# Patient Record
Sex: MALE | Race: WHITE | NOT HISPANIC OR LATINO | Employment: FULL TIME | URBAN - METROPOLITAN AREA
[De-identification: names, ages, dates, MRNs, and addresses within clinical notes are randomized per-mention and may not be internally consistent; named-entity substitution may affect disease eponyms.]

---

## 2017-12-13 ENCOUNTER — OFFICE VISIT (OUTPATIENT)
Dept: FAMILY MEDICINE | Facility: CLINIC | Age: 32
End: 2017-12-13
Payer: COMMERCIAL

## 2017-12-13 VITALS
DIASTOLIC BLOOD PRESSURE: 85 MMHG | HEIGHT: 70 IN | TEMPERATURE: 98.3 F | SYSTOLIC BLOOD PRESSURE: 132 MMHG | WEIGHT: 180.5 LBS | OXYGEN SATURATION: 97 % | BODY MASS INDEX: 25.84 KG/M2 | HEART RATE: 89 BPM

## 2017-12-13 DIAGNOSIS — Z00.00 ENCOUNTER FOR ROUTINE ADULT HEALTH EXAMINATION WITHOUT ABNORMAL FINDINGS: Primary | ICD-10-CM

## 2017-12-13 DIAGNOSIS — F41.9 ANXIETY: ICD-10-CM

## 2017-12-13 PROCEDURE — 99213 OFFICE O/P EST LOW 20 MIN: CPT | Mod: 25 | Performed by: FAMILY MEDICINE

## 2017-12-13 PROCEDURE — 99385 PREV VISIT NEW AGE 18-39: CPT | Performed by: FAMILY MEDICINE

## 2017-12-13 RX ORDER — SERTRALINE HYDROCHLORIDE 100 MG/1
100 TABLET, FILM COATED ORAL DAILY
Qty: 30 TABLET | Refills: 1 | Status: SHIPPED | OUTPATIENT
Start: 2017-12-13 | End: 2018-03-07

## 2017-12-13 ASSESSMENT — ANXIETY QUESTIONNAIRES
5. BEING SO RESTLESS THAT IT IS HARD TO SIT STILL: MORE THAN HALF THE DAYS
2. NOT BEING ABLE TO STOP OR CONTROL WORRYING: MORE THAN HALF THE DAYS
7. FEELING AFRAID AS IF SOMETHING AWFUL MIGHT HAPPEN: MORE THAN HALF THE DAYS
6. BECOMING EASILY ANNOYED OR IRRITABLE: NEARLY EVERY DAY
3. WORRYING TOO MUCH ABOUT DIFFERENT THINGS: NEARLY EVERY DAY
1. FEELING NERVOUS, ANXIOUS, OR ON EDGE: NEARLY EVERY DAY
GAD7 TOTAL SCORE: 17

## 2017-12-13 ASSESSMENT — PATIENT HEALTH QUESTIONNAIRE - PHQ9
5. POOR APPETITE OR OVEREATING: MORE THAN HALF THE DAYS
SUM OF ALL RESPONSES TO PHQ QUESTIONS 1-9: 5

## 2017-12-13 NOTE — MR AVS SNAPSHOT
After Visit Summary   12/13/2017    Angel Luis Reynolds    MRN: [id number]           Patient Information     Date Of Birth          1985        Visit Information        Provider Department      12/13/2017 12:00 PM Miri Wise MD [facility]        Today's Diagnoses     Encounter for routine adult health examination without abnormal findings    -  1    Anxiety          Care Instructions      Preventive Health Recommendations  Male Ages 26 - 39    Yearly exam:             See your health care provider every year in order to  o   Review health changes.   o   Discuss preventive care.    o   Review your medicines if your doctor has prescribed any.    You should be tested each year for STDs (sexually transmitted diseases), if you re at risk.     After age 35, talk to your provider about cholesterol testing. If you are at risk for heart disease, have your cholesterol tested at least every 5 years.     If you are at risk for diabetes, you should have a diabetes test (fasting glucose).  Shots: Get a flu shot each year. Get a tetanus shot every 10 years.     Nutrition:    Eat at least 5 servings of fruits and vegetables daily.     Eat whole-grain bread, whole-wheat pasta and brown rice instead of white grains and rice.     Talk to your provider about Calcium and Vitamin D.     Lifestyle    Exercise for at least 150 minutes a week (30 minutes a day, 5 days a week). This will help you control your weight and prevent disease.     Limit alcohol to one drink per day.     No smoking.     Wear sunscreen to prevent skin cancer.     See your dentist every six months for an exam and cleaning.             Follow-ups after your visit        Who to contact     If you have questions or need follow up information about today's clinic visit or your schedule please contact [facility] directly at [phone].  Normal or non-critical lab and imaging results will be communicated to you by MyChart, letter  "or phone within 4 business days after the clinic has received the results. If you do not hear from us within 7 days, please contact the clinic through Xinrong or phone. If you have a critical or abnormal lab result, we will notify you by phone as soon as possible.  Submit refill requests through Xinrong or call your pharmacy and they will forward the refill request to us. Please allow 3 business days for your refill to be completed.          Additional Information About Your Visit        Xinrong Information     Xinrong gives you secure access to your electronic health record. If you see a primary care provider, you can also send messages to your care team and make appointments. If you have questions, please call your primary care clinic.  If you do not have a primary care provider, please call [phone] and they will assist you.        Care EveryWhere ID     This is your Care EveryWhere ID. This could be used by other organizations to access your [facility] medical records  [id number]        Your Vitals Were     Pulse Temperature Height Pulse Oximetry BMI (Body Mass Index)       89 98.3  F (36.8  C) (Oral) 5' 10\" (1.778 m) 97% 25.9 kg/m2        Blood Pressure from Last 3 Encounters:   12/13/17 132/85    Weight from Last 3 Encounters:   12/13/17 180 lb 8 oz (81.9 kg)              We Performed the Following     OFFICE/OUTPT VISIT,EST,LEVL III          Today's Medication Changes          These changes are accurate as of: 12/13/17 11:59 PM.  If you have any questions, ask your nurse or doctor.               These medicines have changed or have updated prescriptions.        Dose/Directions    * ZOLOFT PO   This may have changed:  Another medication with the same name was added. Make sure you understand how and when to take each.   Changed by:  Miri Wise MD        Dose:  50 mg   Take 50 mg by mouth daily   Refills:  0       * sertraline 100 MG tablet   Commonly known as:  ZOLOFT   This may have changed:  You were " already taking a medication with the same name, and this prescription was added. Make sure you understand how and when to take each.   Used for:  Anxiety   Changed by:  Miri Wise MD        Dose:  100 mg   Take 1 tablet (100 mg) by mouth daily   Quantity:  30 tablet   Refills:  1       * Notice:  This list has 2 medication(s) that are the same as other medications prescribed for you. Read the directions carefully, and ask your doctor or other care provider to review them with you.         Where to get your medicines      These medications [facility] to [id number] [address] [facility] [place] [address] [place] [address]     Phone:  [phone]     sertraline 100 MG tablet                Primary Care Provider Office Phone # Fax #    Miri Wise [phone] [address] [address]        Equal Access to Services     AI[facility]NA [facility] Hospital[facility] AH: Hadii aad ku hadasho Soomaali, waaxda luqadaha, qaybta kaalmada adeegyada, waxay idiin hayaan shekhar chino . So [facility] [phone].    ATENCIÓN: Si habla español, tiene a winslow disposición servicios gratuitos de asistencia lingüística. [facility] [phone].    We comply with applicable federal civil rights laws and [place] laws. We do not discriminate on the basis of race, color, national origin, age, disability, sex, sexual orientation, or gender identity.            Thank you!     Thank you for choosing [facility]  for your care. Our goal is always to provide you with excellent care. Hearing back from our patients is one way we can continue to improve our services. Please take a few minutes to complete the written survey that you may receive in the mail after your visit with us. Thank you!             Your Updated Medication List - Protect others around you: Learn how to safely use, store and throw away your medicines at www.disposemymeds.org.          This  list is accurate as of: 12/13/17 11:59 PM.  Always use your most recent med list.                   Brand Name Dispense Instructions for use Diagnosis    * ZOLOFT PO      Take 50 mg by mouth daily        * sertraline 100 MG tablet    ZOLOFT    30 tablet    Take 1 tablet (100 mg) by mouth daily    Anxiety       * Notice:  This list has 2 medication(s) that are the same as other medications prescribed for you. Read the directions carefully, and ask your doctor or other care provider to review them with you.

## 2017-12-13 NOTE — PROGRESS NOTES
SUBJECTIVE:   CC: Angel Luis Reynolds is an 32 year old male who presents for preventative health visit.     Healthy Habits:    Do you get at least three servings of calcium containing foods daily (dairy, green leafy vegetables, etc.)? yes    Amount of exercise or daily activities, outside of work: 4-5 day(s) per week    Problems taking medications regularly No    Medication side effects: No    Have you had an eye exam in the past two years? no    Do you see a dentist twice per year? yes    Do you have sleep apnea, excessive snoring or daytime drowsiness?no     1) anxiety - he has been on the 50mg of Zoloft for a couple of years now , but feels that his anxiety is getting worse lately , not able to relax , restless , worries more , wondering what to do , he denies any depression, states that there is a strong FH of anxiety in his family.  He denies any side effects while being on the zoloft , does seem to have more stress lately           Today's PHQ-2 Score: No flowsheet data found.      Abuse: Current or Past(Physical, Sexual or Emotional)- No  Do you feel safe in your environment - Yes  Social History   Substance Use Topics     Smoking status: Not on file     Smokeless tobacco: Not on file     Alcohol use Not on file      If you drink alcohol do you typically have >3 drinks per day or >7 drinks per week? No                      Last PSA: No results found for: PSA    Reviewed orders with patient. Reviewed health maintenance and updated orders accordingly - Yes  Labs reviewed in EPIC  BP Readings from Last 3 Encounters:   12/13/17 132/85    Wt Readings from Last 3 Encounters:   12/13/17 180 lb 8 oz (81.9 kg)                  Patient Active Problem List   Diagnosis     Anxiety     History reviewed. No pertinent surgical history.    Social History   Substance Use Topics     Smoking status: Never Smoker     Smokeless tobacco: Never Used     Alcohol use Yes      Comment: Socially      Family History   Problem Relation Age  of Onset     Hypertension Father      Breast Cancer Maternal Grandmother      Coronary Artery Disease Paternal Grandfather      DIABETES Paternal Grandfather      Other - See Comments Paternal Grandfather      Stroke          Current Outpatient Prescriptions   Medication Sig Dispense Refill     Sertraline HCl (ZOLOFT PO) Take 50 mg by mouth daily       sertraline (ZOLOFT) 100 MG tablet Take 1 tablet (100 mg) by mouth daily 30 tablet 1     Not on File  No lab results found.           Reviewed and updated as needed this visit by clinical staffMeds         Reviewed and updated as needed this visit by Provider        History reviewed. No pertinent past medical history.   History reviewed. No pertinent surgical history.    ROS:  C: NEGATIVE for fever, chills, change in weight  I: NEGATIVE for worrisome rashes, moles or lesions  E: NEGATIVE for vision changes or irritation  ENT: NEGATIVE for ear, mouth and throat problems  R: NEGATIVE for significant cough or SOB  CV: NEGATIVE for chest pain, palpitations or peripheral edema  GI: NEGATIVE for nausea, abdominal pain, heartburn, or change in bowel habits   male: negative for dysuria, hematuria, decreased urinary stream, erectile dysfunction, urethral discharge  M: NEGATIVE for significant arthralgias or myalgia  N: NEGATIVE for weakness, dizziness or paresthesias  P: NEGATIVE for changes in mood or affect    OBJECTIVE:   There were no vitals taken for this visit.  EXAM:  GENERAL: healthy, alert and no distress  EYES: Eyes grossly normal to inspection, PERRL and conjunctivae and sclerae normal  HENT: ear canals and TM's normal, nose and mouth without ulcers or lesions  NECK: no adenopathy, no asymmetry, masses, or scars and thyroid normal to palpation  RESP: lungs clear to auscultation - no rales, rhonchi or wheezes  CV: regular rate and rhythm, normal S1 S2, no S3 or S4, no murmur, click or rub, no peripheral edema and peripheral pulses strong  ABDOMEN: soft, nontender,  no hepatosplenomegaly, no masses and bowel sounds normal  MS: no gross musculoskeletal defects noted, no edema  SKIN: no suspicious lesions or rashes  NEURO: Normal strength and tone, mentation intact and speech normal  PSYCH: mentation appears normal, affect normal/bright    ASSESSMENT/PLAN:   1. Encounter for routine adult health examination without abnormal findings  Doing well, will come back for fasting labs     2. Anxiety  Seems that his anxiety is getting worse lately and he has been on the 50mg of Zoloft , discussed increasing the dose to 100mg daily , also seeing a therapist , more walking /exercise , Vit D at 2000 UI daily . He will need a f/u in four to 5 weeks sooner if any side effects or worsening symptoms develop. Pt is aware  and comfortable with the current plan.\    - sertraline (ZOLOFT) 100 MG tablet; Take 1 tablet (100 mg) by mouth daily  Dispense: 30 tablet; Refill: 1    COUNSELING:  Reviewed preventive health counseling, as reflected in patient instructions       Regular exercise       Healthy diet/nutrition       Vision screening       Safe sex practices/STD prevention       has no tobacco history on file.      There is no height or weight on file to calculate BMI.         Counseling Resources:  ATP IV Guidelines  Pooled Cohorts Equation Calculator  FRAX Risk Assessment  ICSI Preventive Guidelines  Dietary Guidelines for Americans, 2010  USDA's MyPlate  ASA Prophylaxis  Lung CA Screening    Miri Wise MD  [facility] [place]

## 2017-12-14 ASSESSMENT — ANXIETY QUESTIONNAIRES: GAD7 TOTAL SCORE: 17

## 2018-03-05 DIAGNOSIS — F41.9 ANXIETY: ICD-10-CM

## 2018-03-05 NOTE — TELEPHONE ENCOUNTER
"Last OV 12/13/2017 - noted to f/u in 4-5 weeks (new pt to LS)  Due for f/u  [employer] message sent to pt.  Cynthia CHEN RN    Requested Prescriptions   Pending Prescriptions Disp Refills     sertraline (ZOLOFT) 100 MG tablet [Pharmacy Med Name: SERTRALINE 100MG TABLETS] 30 tablet 0     Sig: TAKE 1 TABLET(100 MG) BY MOUTH DAILY    SSRIs Protocol Passed    3/5/2018 12:11 PM       Passed - Recent (12 mo) or future (30 days) visit within the authorizing provider's specialty    Patient had office visit in the last year or has a visit in the next 30 days with authorizing provider.  See \"Patient Info\" tab in inbasket, or \"Choose Columns\" in Meds & Orders section of the refill encounter.            Passed - Patient is age 18 or older              "

## 2018-03-07 ENCOUNTER — TELEPHONE (OUTPATIENT)
Dept: FAMILY MEDICINE | Facility: CLINIC | Age: 33
End: 2018-03-07

## 2018-03-07 DIAGNOSIS — F41.9 ANXIETY: ICD-10-CM

## 2018-03-07 RX ORDER — SERTRALINE HYDROCHLORIDE 100 MG/1
TABLET, FILM COATED ORAL
Refills: 0
Start: 2018-03-07

## 2018-03-07 RX ORDER — SERTRALINE HYDROCHLORIDE 100 MG/1
100 TABLET, FILM COATED ORAL DAILY
Qty: 90 TABLET | Refills: 0 | Status: SHIPPED | OUTPATIENT
Start: 2018-03-07 | End: 2019-06-06

## 2018-03-07 NOTE — TELEPHONE ENCOUNTER
Yes we addressed his anxiety in December when he had the PE but the anxiety was not well controlled on the 50mg and I increased the dose to 100mg  , I normally have pts come back in one to two months for recheck after change of dose , since it has been almost three months he would need a f/u visit .  If he feels fine and he just wants a refill , that is fine but can you let him know that he needs to be seen every 6 months even if everything is fine?  Thanks

## 2018-03-07 NOTE — TELEPHONE ENCOUNTER
LS  See below note from patient.   Was increased to 100 mg to 50 mg in December, was supposed to f/u in 4-5 weeks  Does he need an OV? or phone visit ok?  Thank you,  FARHAT Enciso

## 2018-03-07 NOTE — TELEPHONE ENCOUNTER
LS  Reviewed your note to patient. He understands and plans to be in for OV in June. Says that everything is going well and feels anxiety has improved a lot. Requesting refills until June when he is due for 6 month f/u.   Please advise.  Zaria Emery RN

## 2018-03-07 NOTE — TELEPHONE ENCOUNTER
Reason for Call:  Other prescription    Detailed comments: Pt called and requested clarification on last mychart message. Pt stated he was charged for last (physical) appt maybe because they addressed his anxiaty and now the doctor wants him to come back for another visit in order to get his zoloft refill. Pt is NOT new to medication. He just moved to [place] and was establishing care with Dr. Wise. Pt has taken this medication for over 10 months and he established care here so he could get his medication refilled.  Please call pt to clarify.    Phone Number Patient can be reached at: Home number on file [phone] (home)    Best Time: Any time    Can we leave a detailed message on this number? YES    Call taken on 3/7/2018 at 3:58 PM by Eula Espinal

## 2018-07-27 DIAGNOSIS — F41.9 ANXIETY: ICD-10-CM

## 2018-07-27 NOTE — TELEPHONE ENCOUNTER
"Requested Prescriptions   Pending Prescriptions Disp Refills     sertraline (ZOLOFT) 100 MG tablet [Pharmacy Med Name: SERTRALINE 100MG TABLETS] 90 tablet 0     Sig: TAKE 1 TABLET(100 MG) BY MOUTH DAILY    SSRIs Protocol Passed    7/27/2018  1:25 PM       Passed - Recent (12 mo) or future (30 days) visit within the authorizing provider's specialty    Patient had office visit in the last 12 months or has a visit in the next 30 days with authorizing provider or within the authorizing provider's specialty.  See \"Patient Info\" tab in inbasket, or \"Choose Columns\" in Meds & Orders section of the refill encounter.           Passed - Patient is age 18 or older        "

## 2018-07-30 ENCOUNTER — MYC REFILL (OUTPATIENT)
Dept: FAMILY MEDICINE | Facility: CLINIC | Age: 33
End: 2018-07-30

## 2018-07-30 DIAGNOSIS — F41.9 ANXIETY: ICD-10-CM

## 2018-07-30 RX ORDER — SERTRALINE HYDROCHLORIDE 100 MG/1
100 TABLET, FILM COATED ORAL DAILY
Qty: 90 TABLET | Refills: 0 | Status: CANCELLED | OUTPATIENT
Start: 2018-07-30

## 2018-07-30 NOTE — TELEPHONE ENCOUNTER
Note from 3/7/18 refill encounter:  LS  Reviewed your note to patient. He understands and plans to be in for OV in June. Says that everything is going well and feels anxiety has improved a lot. Requesting refills until June when he is due for 6 month f/u    Mych[employer] message sent to patient asking him to schedule appointment.  Maddie Callahan RN

## 2018-07-30 NOTE — TELEPHONE ENCOUNTER
Message from RRsatt:  Original authorizing provider: Miri Wise MD    Angel Luis Reynolds would like a refill of the following medications:  sertraline (ZOLOFT) 100 MG tablet [Miri Wise MD]    Preferred pharmacy: [facility] [id number] [place] [address] [address] AT [facility] [address]    Comment:  I would like to get this refilled. I am not going to continue to pay for a Dr visit.

## 2018-07-30 NOTE — TELEPHONE ENCOUNTER
Patient sent [employer] message:  Dimas Perkins- I am not going to come in and pay to see her again. Please either refill the medication (this is really helping me!) Or i will be finding another Dr.   Thanks!     Will inform LS.  Maddie Callahan RN

## 2018-08-01 RX ORDER — SERTRALINE HYDROCHLORIDE 100 MG/1
TABLET, FILM COATED ORAL
Start: 2018-08-01

## 2018-08-01 NOTE — TELEPHONE ENCOUNTER
See MyChart response to pt from triage - LS need to see pt  Denied refill request - due for appt  Cynthia CHEN RN

## 2019-06-03 ASSESSMENT — PATIENT HEALTH QUESTIONNAIRE - PHQ9
SUM OF ALL RESPONSES TO PHQ QUESTIONS 1-9: 11
SUM OF ALL RESPONSES TO PHQ QUESTIONS 1-9: 11
10. IF YOU CHECKED OFF ANY PROBLEMS, HOW DIFFICULT HAVE THESE PROBLEMS MADE IT FOR YOU TO DO YOUR WORK, TAKE CARE OF THINGS AT HOME, OR GET ALONG WITH OTHER PEOPLE: SOMEWHAT DIFFICULT

## 2019-06-06 ENCOUNTER — OFFICE VISIT (OUTPATIENT)
Dept: FAMILY MEDICINE | Facility: CLINIC | Age: 34
End: 2019-06-06
Payer: COMMERCIAL

## 2019-06-06 VITALS
RESPIRATION RATE: 16 BRPM | OXYGEN SATURATION: 98 % | HEIGHT: 70 IN | TEMPERATURE: 98 F | SYSTOLIC BLOOD PRESSURE: 135 MMHG | DIASTOLIC BLOOD PRESSURE: 81 MMHG | WEIGHT: 186.13 LBS | HEART RATE: 102 BPM | BODY MASS INDEX: 26.65 KG/M2

## 2019-06-06 DIAGNOSIS — Z00.00 ROUTINE GENERAL MEDICAL EXAMINATION AT A HEALTH CARE FACILITY: Primary | ICD-10-CM

## 2019-06-06 DIAGNOSIS — F33.1 MODERATE EPISODE OF RECURRENT MAJOR DEPRESSIVE DISORDER (H): ICD-10-CM

## 2019-06-06 DIAGNOSIS — F41.9 ANXIETY: ICD-10-CM

## 2019-06-06 DIAGNOSIS — R03.0 ELEVATED BP WITHOUT DIAGNOSIS OF HYPERTENSION: ICD-10-CM

## 2019-06-06 PROCEDURE — 99214 OFFICE O/P EST MOD 30 MIN: CPT | Mod: 25 | Performed by: FAMILY MEDICINE

## 2019-06-06 PROCEDURE — 99395 PREV VISIT EST AGE 18-39: CPT | Performed by: FAMILY MEDICINE

## 2019-06-06 RX ORDER — FLUOXETINE 20 MG/1
20 TABLET, FILM COATED ORAL DAILY
Qty: 30 TABLET | Refills: 3 | Status: SHIPPED | OUTPATIENT
Start: 2019-06-06 | End: 2019-10-03

## 2019-06-06 ASSESSMENT — ANXIETY QUESTIONNAIRES
IF YOU CHECKED OFF ANY PROBLEMS ON THIS QUESTIONNAIRE, HOW DIFFICULT HAVE THESE PROBLEMS MADE IT FOR YOU TO DO YOUR WORK, TAKE CARE OF THINGS AT HOME, OR GET ALONG WITH OTHER PEOPLE: SOMEWHAT DIFFICULT
GAD7 TOTAL SCORE: 21
7. FEELING AFRAID AS IF SOMETHING AWFUL MIGHT HAPPEN: NEARLY EVERY DAY
2. NOT BEING ABLE TO STOP OR CONTROL WORRYING: NEARLY EVERY DAY
1. FEELING NERVOUS, ANXIOUS, OR ON EDGE: NEARLY EVERY DAY
5. BEING SO RESTLESS THAT IT IS HARD TO SIT STILL: NEARLY EVERY DAY
6. BECOMING EASILY ANNOYED OR IRRITABLE: NEARLY EVERY DAY
3. WORRYING TOO MUCH ABOUT DIFFERENT THINGS: NEARLY EVERY DAY

## 2019-06-06 ASSESSMENT — PATIENT HEALTH QUESTIONNAIRE - PHQ9: 5. POOR APPETITE OR OVEREATING: NEARLY EVERY DAY

## 2019-06-06 ASSESSMENT — PAIN SCALES - GENERAL: PAINLEVEL: NO PAIN (0)

## 2019-06-06 ASSESSMENT — MIFFLIN-ST. JEOR: SCORE: 1795.51

## 2019-06-06 NOTE — PROGRESS NOTES
SUBJECTIVE:   CC: Angel Luis Reynolds is an 34 year old male who presents for preventative health visit.     Very irritable, always worried, often depressed but more worried than depression  Low motivation  gaining weight, craving food, stress eating.  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    Took Zoloft for 6-8 months, was up to 100 mg  Even lower doses caused so much fatigue , felt wiped out on it- was taking it in afternoon, or late morning and could hardly stay up and had to lay to rest- while working hours. It did help with anxiety but fatigue was disabling.  Remembers in remote past tried prozac and that seem to have helped more without fatigue . No current therapy .  Healthy Habits:     Getting at least 3 servings of Calcium per day:  Yes    Bi-annual eye exam:  NO    Dental care twice a year:  Yes    Sleep apnea or symptoms of sleep apnea:  None    Diet:  Regular (no restrictions)    Frequency of exercise:  2-3 days/week    Duration of exercise:  15-30 minutes    Taking medications regularly:  Yes    Medication side effects:  Other    PHQ-2 Total Score: 4    Additional concerns today:  No      PROBLEMS TO ADD ON...    Today's PHQ-2 Score:   PHQ-2 ( 1999 [employer]) 6/3/2019   Q1: Little interest or pleasure in doing things 2   Q2: Feeling down, depressed or hopeless 2   PHQ-2 Score 4   Q1: Little interest or pleasure in doing things More than half the days   Q2: Feeling down, depressed or hopeless More than half the days   PHQ-2 Score 4       Abuse: Current or Past(Physical, Sexual or Emotional)- No  Do you feel safe in your environment? Yes    Social History     Tobacco Use     Smoking status: Never Smoker     Smokeless tobacco: Never Used   Substance Use Topics     Alcohol use: Yes     Comment: Socially      If you drink alcohol do you typically have >3 drinks per day or >7 drinks per week? No    No flowsheet data found.    Last PSA: No results found for: PSA    Reviewed orders with patient. Reviewed health  "maintenance and updated orders accordingly - Yes  BP Readings from Last 3 Encounters:   06/06/19 135/81   12/13/17 132/85    Wt Readings from Last 3 Encounters:   06/06/19 84.4 kg (186 lb 2 oz)   12/13/17 81.9 kg (180 lb 8 oz)                    Reviewed and updated as needed this visit by clinical staff  Tobacco  Allergies  Meds         Reviewed and updated as needed this visit by Provider            Review of Systems  CONSTITUTIONAL: NEGATIVE for fever, chills, change in weight  INTEGUMENTARY/SKIN: NEGATIVE for worrisome rashes, moles or lesions  EYES: NEGATIVE for vision changes or irritation  ENT: NEGATIVE for ear, mouth and throat problems  RESP: NEGATIVE for significant cough or SOB  CV: NEGATIVE for chest pain, palpitations or peripheral edema  GI: NEGATIVE for nausea, abdominal pain, heartburn, or change in bowel habits   male: negative for dysuria, hematuria, decreased urinary stream, erectile dysfunction, urethral discharge  MUSCULOSKELETAL: NEGATIVE for significant arthralgias or myalgia  NEURO: NEGATIVE for weakness, dizziness or paresthesias  PSYCHIATRIC: NEGATIVE for changes in mood or affect    OBJECTIVE:   /81 (BP Location: Left arm, Patient Position: Sitting, Cuff Size: Adult Regular)   Pulse 102   Temp 98  F (36.7  C) (Oral)   Resp 16   Ht 1.778 m (5' 10\")   Wt 84.4 kg (186 lb 2 oz)   SpO2 98%   BMI 26.71 kg/m      Physical Exam  GENERAL: healthy, alert and no distress  EYES: Eyes grossly normal to inspection, PERRL and conjunctivae and sclerae normal  HENT: ear canals and TM's normal, nose and mouth without ulcers or lesions  NECK: no adenopathy, no asymmetry, masses, or scars and thyroid normal to palpation  RESP: lungs clear to auscultation - no rales, rhonchi or wheezes  CV: regular rate and rhythm, normal S1 S2, no S3 or S4, no murmur, click or rub, no peripheral edema and peripheral pulses strong  ABDOMEN: soft, nontender, no hepatosplenomegaly, no masses and bowel sounds " normal  MS: no gross musculoskeletal defects noted, no edema  SKIN: no suspicious lesions or rashes  NEURO: Normal strength and tone, mentation intact and speech normal  PSYCH: mentation appears normal, affect normal/bright    Diagnostic Test Results:  Labs reviewed in Epic    ASSESSMENT/PLAN:   1. Anxiety  2. Moderate episode of recurrent major depressive disorder (H)  35yo male with known history of depression and anxiety  Sertraline/zoloft- help but caused fatigue   Plan: treatment options discussed in detail, of multifaceted approach  - FLUoxetine 20 MG tablet; Take 1 tablet (20 mg) by mouth daily  Dispense: 30 tablet; Refill: 3  encouraged physical activity - he is a runner, willing to resume that.  -discussed counseling, he is willing to consider it   - MENTAL HEALTH REFERRAL  - Adult; Outpatient Treatment; Individual/Couples/Family/Group Therapy/Health Psychology; [facility] [phone]; We will contact you to schedule the appointment or please call with any questions  Follow up in 4 weeks    3.Pre-hypertension   Counseled   -Normal  BP is  119/79 or lower. Upper number is systolic Blood pressure (SBP). Lower number is diastolic  Blood pressure (DBP)  -Blood pressure between 120-139/80-89 (prehypertensive blood pressure/ class 1 hypertension )   -Hypertensive is  BP of 140/90 or above and needs treatment with medication.  -life style changes that are beneficial to reach goal of normal Blood pressure   1.Weight loss of 1 kg can reduce systolic Blood pressure  (SBP) by 1 mmHg  2.Health healthy diet (eg DASH dietary pattern can reduce SBP by 11 mmHg)  3.Structured excercise program (150 mins aerobic activity/week can reduce SBP by 5 mmHg)  4.Low salt  diet - very important.(eg: cut by 255 or 1000 mg/day to reduce SBP by 5mmHg)  5. Increase 4-5 serving of fresh vegetables & fruits /day- good source of potassium.      BP Readings from Last 6 Encounters:   06/06/19 135/81   12/13/17 132/85  "    Wt Readings from Last 5 Encounters:   06/06/19 84.4 kg (186 lb 2 oz)   12/13/17 81.9 kg (180 lb 8 oz)       COUNSELING:   Reviewed preventive health counseling, as reflected in patient instructions       Regular exercise       Healthy diet/nutrition    Estimated body mass index is 26.71 kg/m  as calculated from the following:    Height as of this encounter: 1.778 m (5' 10\").    Weight as of this encounter: 84.4 kg (186 lb 2 oz).          reports that he has never smoked. He has never used smokeless tobacco.      Counseling Resources:  ATP IV Guidelines  Pooled Cohorts Equation Calculator  FRAX Risk Assessment  ICSI Preventive Guidelines  Dietary Guidelines for Americans, 2010  USDA's MyPlate  ASA Prophylaxis  Lung CA Screening    Magaly Alexander MD  [facility]  Answers for HPI/ROS submitted by the patient on 6/3/2019   Annual Exam:  If you checked off any problems, how difficult have these problems made it for you to do your work, take care of things at home, or get along with other people?: Somewhat difficult  PHQ9 TOTAL SCORE: 11    "

## 2019-06-06 NOTE — PATIENT INSTRUCTIONS
Start prozac 20 mg once daily /am  If makes you tired- take at bedtime  Follow up in clinic in about 4-6 weeks,  If significantly fatigued, call or my chart early- will consider a change of selective serotonin reuptake inhibitor on phone.  Start excercise , enjoy running  Start counseling      -Normal  BP is  119/79 or lower. Upper number is systolic Blood pressure (SBP). Lower number is diastolic  Blood pressure (DBP)  -Blood pressure between 120-139/80-89 (prehypertensive blood pressure/ class 1 hypertension )   -Hypertensive is  BP of 140/90 or above and needs treatment with medication.  -life style changes that are beneficial to reach goal of normal Blood pressure   1.Weight loss of 1 kg can reduce systolic Blood pressure  (SBP) by 1 mmHg  2.Health healthy diet (eg DASH dietary pattern can reduce SBP by 11 mmHg)  3.Structured excercise program (150 mins aerobic activity/week can reduce SBP by 5 mmHg)  4.Low salt  diet - very important.(eg: cut by 255 or 1000 mg/day to reduce SBP by 5mmHg)  5. Increase 4-5 serving of fresh vegetables & fruits /day- good source of potassium.      if More than 140/90  after a month of above life style changes  Return visit with MD/provider  for recheck & consideration of medications .         Preventive Health Recommendations  Male Ages 26 - 39    Yearly exam:             See your health care provider every year in order to  o   Review health changes.   o   Discuss preventive care.    o   Review your medicines if your doctor has prescribed any.    You should be tested each year for STDs (sexually transmitted diseases), if you re at risk.     After age 35, talk to your provider about cholesterol testing. If you are at risk for heart disease, have your cholesterol tested at least every 5 years.     If you are at risk for diabetes, you should have a diabetes test (fasting glucose).  Shots: Get a flu shot each year. Get a tetanus shot every 10 years.     Nutrition:    Eat at least 5  servings of fruits and vegetables daily.     Eat whole-grain bread, whole-wheat pasta and brown rice instead of white grains and rice.     Get adequate Calcium and Vitamin D.     Lifestyle    Exercise for at least 150 minutes a week (30 minutes a day, 5 days a week). This will help you control your weight and prevent disease.     Limit alcohol to one drink per day.     No smoking.     Wear sunscreen to prevent skin cancer.     See your dentist every six months for an exam and cleaning.

## 2019-06-07 ASSESSMENT — ANXIETY QUESTIONNAIRES: GAD7 TOTAL SCORE: 21

## 2019-10-03 DIAGNOSIS — F33.1 MODERATE EPISODE OF RECURRENT MAJOR DEPRESSIVE DISORDER (H): ICD-10-CM

## 2019-10-03 DIAGNOSIS — F41.9 ANXIETY: ICD-10-CM

## 2019-10-03 NOTE — TELEPHONE ENCOUNTER
"Last Written Prescription Date:  6/6/19  Last Fill Quantity: 30,  # refills: 3  Last office visit: 6/6/2019 with prescribing provider:  6/6/19   Future Office Visit:      Requested Prescriptions   Pending Prescriptions Disp Refills     FLUoxetine 20 MG tablet [Pharmacy Med Name: FLUOXETINE 20MG TABLETS] 30 tablet 0     Sig: TAKE 1 TABLET(20 MG) BY MOUTH DAILY       SSRIs Protocol Failed - 10/3/2019  8:55 AM        Failed - PHQ-9 score less than 5 in past 6 months     Please review last PHQ-9 score.           Passed - Medication is active on med list        Passed - Patient is age 18 or older        Passed - Recent (6 mo) or future (30 days) visit within the authorizing provider's specialty     Patient had office visit in the last 6 months or has a visit in the next 30 days with authorizing provider or within the authorizing provider's specialty.  See \"Patient Info\" tab in inbasket, or \"Choose Columns\" in Meds & Orders section of the refill encounter.              "

## 2019-10-04 ENCOUNTER — MYC MEDICAL ADVICE (OUTPATIENT)
Dept: FAMILY MEDICINE | Facility: CLINIC | Age: 34
End: 2019-10-04

## 2019-10-04 RX ORDER — FLUOXETINE 20 MG/1
TABLET, FILM COATED ORAL
Qty: 30 TABLET | Refills: 0 | Status: SHIPPED | OUTPATIENT
Start: 2019-10-04 | End: 2019-10-07

## 2019-10-04 NOTE — TELEPHONE ENCOUNTER
Note from 6/6/19 OV:   Return in about 1 month (around 7/4/2019) for mood, anxiety.      Start prozac 20 mg once daily /am  If makes you tired- take at bedtime  Follow up in clinic in about 4-6 weeks,        [employer] message sent to patient asking him to schedule an appointment  Maddie Callahan RN

## 2019-10-04 NOTE — TELEPHONE ENCOUNTER
Patient scheduled appointment with A.S for 10/7  Medication is being filled for 1 time refill only due to:  Patient needs to be seen because due for follow up.   Maddie Callahan RN

## 2019-10-07 ENCOUNTER — OFFICE VISIT (OUTPATIENT)
Dept: FAMILY MEDICINE | Facility: CLINIC | Age: 34
End: 2019-10-07
Payer: COMMERCIAL

## 2019-10-07 VITALS
TEMPERATURE: 97.9 F | DIASTOLIC BLOOD PRESSURE: 79 MMHG | RESPIRATION RATE: 16 BRPM | WEIGHT: 185.25 LBS | OXYGEN SATURATION: 98 % | HEIGHT: 70 IN | SYSTOLIC BLOOD PRESSURE: 124 MMHG | HEART RATE: 70 BPM | BODY MASS INDEX: 26.52 KG/M2

## 2019-10-07 DIAGNOSIS — F41.9 ANXIETY: ICD-10-CM

## 2019-10-07 DIAGNOSIS — F33.1 MODERATE EPISODE OF RECURRENT MAJOR DEPRESSIVE DISORDER (H): ICD-10-CM

## 2019-10-07 PROCEDURE — 99214 OFFICE O/P EST MOD 30 MIN: CPT | Performed by: FAMILY MEDICINE

## 2019-10-07 PROCEDURE — 96127 BRIEF EMOTIONAL/BEHAV ASSMT: CPT | Performed by: FAMILY MEDICINE

## 2019-10-07 RX ORDER — FLUOXETINE 20 MG/1
20 TABLET, FILM COATED ORAL DAILY
Qty: 90 TABLET | Refills: 3 | Status: SHIPPED | OUTPATIENT
Start: 2019-10-07 | End: 2020-01-07

## 2019-10-07 ASSESSMENT — ANXIETY QUESTIONNAIRES
IF YOU CHECKED OFF ANY PROBLEMS ON THIS QUESTIONNAIRE, HOW DIFFICULT HAVE THESE PROBLEMS MADE IT FOR YOU TO DO YOUR WORK, TAKE CARE OF THINGS AT HOME, OR GET ALONG WITH OTHER PEOPLE: NOT DIFFICULT AT ALL
6. BECOMING EASILY ANNOYED OR IRRITABLE: NOT AT ALL
5. BEING SO RESTLESS THAT IT IS HARD TO SIT STILL: NOT AT ALL
1. FEELING NERVOUS, ANXIOUS, OR ON EDGE: SEVERAL DAYS
3. WORRYING TOO MUCH ABOUT DIFFERENT THINGS: SEVERAL DAYS
7. FEELING AFRAID AS IF SOMETHING AWFUL MIGHT HAPPEN: NOT AT ALL
GAD7 TOTAL SCORE: 2
2. NOT BEING ABLE TO STOP OR CONTROL WORRYING: NOT AT ALL

## 2019-10-07 ASSESSMENT — MIFFLIN-ST. JEOR: SCORE: 1791.54

## 2019-10-07 ASSESSMENT — PATIENT HEALTH QUESTIONNAIRE - PHQ9
5. POOR APPETITE OR OVEREATING: NOT AT ALL
SUM OF ALL RESPONSES TO PHQ QUESTIONS 1-9: 0

## 2019-10-07 NOTE — NURSING NOTE
"Chief Complaint   Patient presents with     Anxiety     Depression     Medication Reconciliation     /79 (BP Location: Right arm, Patient Position: Sitting, Cuff Size: Adult Large)   Pulse 70   Temp 97.9  F (36.6  C) (Oral)   Resp 16   Ht 1.778 m (5' 10\")   Wt 84 kg (185 lb 4 oz)   SpO2 98%   BMI 26.58 kg/m   Estimated body mass index is 26.58 kg/m  as calculated from the following:    Height as of this encounter: 1.778 m (5' 10\").    Weight as of this encounter: 84 kg (185 lb 4 oz).  bp completed using cuff size: large      Health Maintenance addressed:  NONE    N/a  Shira Henry CMA on 10/7/2019 at 12:28 PM                  "

## 2019-10-07 NOTE — PROGRESS NOTES
Subjective     Angel Luis Reynolds is a 34 year old male who presents to clinic today for the following health issues:    HPI   Depression and Anxiety Follow-Up    How are you doing with your depression since your last visit? Improved     How are you doing with your anxiety since your last visit?  Improved     Are you having other symptoms that might be associated with depression or anxiety? No    Have you had a significant life event? No     Do you have any concerns with your use of alcohol or other drugs? No  Has 3 yo and 4 month old at home.  Medications has really help. No side effects , except will start taking it in am- instead of noon.  Some fatigue but not much.  he denies suicidal thoughts or ideation.reports no side effects from medications. Would like to continue.      Has tried to cut back on salt in diet.  BP Readings from Last 3 Encounters:   10/07/19 124/79   06/06/19 135/81   12/13/17 132/85       Social History     Tobacco Use     Smoking status: Never Smoker     Smokeless tobacco: Never Used   Substance Use Topics     Alcohol use: Yes     Comment: Socially      Drug use: No     PHQ 6/3/2019 6/6/2019 10/7/2019   PHQ-9 Total Score 11 10 0   Q9: Thoughts of better off dead/self-harm past 2 weeks Not at all Not at all Not at all     VALERY-7 SCORE 12/13/2017 6/6/2019 10/7/2019   Total Score 17 21 2     No flowsheet data found.  .    Suicide Assessment Five-step Evaluation and Treatment (SAFE-T)      How many servings of fruits and vegetables do you eat daily?  2-3    On average, how many sweetened beverages do you drink each day (soda, juice, sweet tea, etc)?   2-3 CUPS of coffee    How many days per week do you miss taking your medication? 0        PROBLEMS TO ADD ON...    BP Readings from Last 3 Encounters:   10/07/19 124/79   06/06/19 135/81   12/13/17 132/85    Wt Readings from Last 3 Encounters:   10/07/19 84 kg (185 lb 4 oz)   06/06/19 84.4 kg (186 lb 2 oz)   12/13/17 81.9 kg (180 lb 8 oz)                 "    PROBLEMS TO ADD ON...  Reviewed and updated as needed this visit by Provider         Review of Systems   ROS COMP: Constitutional, HEENT, cardiovascular, pulmonary, GI, , musculoskeletal, neuro, skin, endocrine and psych systems are negative, except as otherwise noted.      Objective    /79 (BP Location: Right arm, Patient Position: Sitting, Cuff Size: Adult Large)   Pulse 70   Temp 97.9  F (36.6  C) (Oral)   Resp 16   Ht 1.778 m (5' 10\")   Wt 84 kg (185 lb 4 oz)   SpO2 98%   BMI 26.58 kg/m    Body mass index is 26.58 kg/m .  Physical Exam   GENERAL: healthy, alert and no distress  NECK: no adenopathy, no asymmetry, masses, or scars and thyroid normal to palpation  RESP: lungs clear to auscultation - no rales, rhonchi or wheezes  CV: regular rates and rhythm  MS: no gross musculoskeletal defects noted, no edema  PSYCH: mentation appears normal, affect normal/bright    Diagnostic Test Results:  Labs reviewed in Epic        Assessment & Plan     1. Anxiety    - FLUoxetine 20 MG tablet; Take 1 tablet (20 mg) by mouth daily  Dispense: 90 tablet; Refill: 3  -self care, excercise most days of the week- he enjoys walks   - EMOTIONAL / BEHAVIORAL ASSESSMENT      2. Moderate episode of recurrent major depressive disorder (H)    - EMOTIONAL / BEHAVIORAL ASSESSMENT  - FLUoxetine 20 MG tablet; Take 1 tablet (20 mg) by mouth daily  Dispense: 90 tablet; Refill: 3     BMI:   Estimated body mass index is 26.58 kg/m  as calculated from the following:    Height as of this encounter: 1.778 m (5' 10\").    Weight as of this encounter: 84 kg (185 lb 4 oz).           Regular exercise, keep salt intake low    Return in about 6 months (around 4/7/2020) for medications recheck/review/refill.    Magaly Alexander MD  [facility]      "

## 2019-10-08 ASSESSMENT — ANXIETY QUESTIONNAIRES: GAD7 TOTAL SCORE: 2

## 2019-10-30 ENCOUNTER — OFFICE VISIT (OUTPATIENT)
Dept: FAMILY MEDICINE | Facility: CLINIC | Age: 34
End: 2019-10-30
Payer: COMMERCIAL

## 2019-10-30 VITALS
HEIGHT: 70 IN | RESPIRATION RATE: 17 BRPM | WEIGHT: 186.1 LBS | DIASTOLIC BLOOD PRESSURE: 87 MMHG | SYSTOLIC BLOOD PRESSURE: 126 MMHG | HEART RATE: 94 BPM | TEMPERATURE: 98.5 F | BODY MASS INDEX: 26.64 KG/M2 | OXYGEN SATURATION: 99 %

## 2019-10-30 DIAGNOSIS — J01.10 ACUTE NON-RECURRENT FRONTAL SINUSITIS: Primary | ICD-10-CM

## 2019-10-30 PROCEDURE — 99214 OFFICE O/P EST MOD 30 MIN: CPT | Performed by: FAMILY MEDICINE

## 2019-10-30 RX ORDER — AZITHROMYCIN 250 MG/1
TABLET, FILM COATED ORAL
Qty: 6 TABLET | Refills: 0 | Status: SHIPPED | OUTPATIENT
Start: 2019-10-30 | End: 2021-03-24

## 2019-10-30 ASSESSMENT — MIFFLIN-ST. JEOR: SCORE: 1795.39

## 2019-10-30 NOTE — NURSING NOTE
"Chief Complaint   Patient presents with     URI     Sinus Problem x1 week     /87   Pulse 94   Temp 98.5  F (36.9  C) (Oral)   Resp 17   Ht 1.778 m (5' 10\")   Wt 84.4 kg (186 lb 1.6 oz)   SpO2 99%   BMI 26.70 kg/m   Estimated body mass index is 26.7 kg/m  as calculated from the following:    Height as of this encounter: 1.778 m (5' 10\").    Weight as of this encounter: 84.4 kg (186 lb 1.6 oz).  Medication Reconciliation: complete        Health Maintenance Due Pending Provider Review:  NONE    n/a    Slime Grossman MA  [facility]  [phone]  "

## 2019-10-30 NOTE — PROGRESS NOTES
"Subjective     Angel Luis Reynolds is a 34 year old male who presents to clinic today for the following health issues:    HPI   RESPIRATORY SYMPTOMS      Duration: 1 week    Description  Sinus pain and pressure, laryngitis    Severity: moderate    Accompanying signs and symptoms: None    History (predisposing factors):  none    Precipitating or alleviating factors: None    Therapies tried and outcome:  DayQuil and NyQuil does seem to help    PROBLEMS TO ADD ON...      BP Readings from Last 3 Encounters:   10/30/19 126/87   10/07/19 124/79   06/06/19 135/81    Wt Readings from Last 3 Encounters:   10/30/19 84.4 kg (186 lb 1.6 oz)   10/07/19 84 kg (185 lb 4 oz)   06/06/19 84.4 kg (186 lb 2 oz)                    PROBLEMS TO ADD ON...  Reviewed and updated as needed this visit by Provider         Review of Systems   ROS COMP: Constitutional, HEENT, cardiovascular, pulmonary, GI, , musculoskeletal, neuro, skin, endocrine and psych systems are negative, except as otherwise noted.      Objective    /87   Pulse 94   Temp 98.5  F (36.9  C) (Oral)   Resp 17   Ht 1.778 m (5' 10\")   Wt 84.4 kg (186 lb 1.6 oz)   SpO2 99%   BMI 26.70 kg/m    Body mass index is 26.7 kg/m .  Physical Exam   GENERAL: healthy, alert and no distress  HENT: ear canals and TM's normal, nose and mouth without ulcers or lesions  NECK: no adenopathy, no asymmetry, masses, or scars and thyroid normal to palpation  RESP: lungs clear to auscultation - no rales, rhonchi or wheezes  CV: regular rate and rhythm, normal S1 S2, no S3 or S4, no murmur, click or rub, no peripheral edema and peripheral pulses strong  ABDOMEN: soft, nontender, no hepatosplenomegaly, no masses and bowel sounds normal  MS: no gross musculoskeletal defects noted, no edema    Diagnostic Test Results:  Labs reviewed in Epic        Assessment & Plan   Viral vs bacterial- symptomatic treatment first & if not better in 3 days- ok to start antibiotics as below    1. Acute " "non-recurrent frontal sinusitis      Saline nasal sprays (irrigation)    Topical nasal steroids, like fluticasone up to twice daily for 2 weeks,later as needed     Over the counter     Anti-histamines, ideally non sedating second generation, like loratadine (claritin) cetirizine (zyrtec) fexofenadine (Allegra)  - azithromycin (ZITHROMAX) 250 MG tablet; 2Tabs day 1 &  Than 1TAB  once daily for 4 more day  Dispense: 6 tablet; Refill: 0     Potential medication side effects were discussed with the patient; let me know if any occur.    BMI:   Estimated body mass index is 26.7 kg/m  as calculated from the following:    Height as of this encounter: 1.778 m (5' 10\").    Weight as of this encounter: 84.4 kg (186 lb 1.6 oz).     Return in about 2 weeks (around 11/13/2019) for concerns,unresolved.    Magaly Alexander MD  [facility]      "

## 2019-10-30 NOTE — PATIENT INSTRUCTIONS
Saline nasal sprays (irrigation)    Topical nasal steroids, like fluticasone up to twice daily for 2 weeks,later as needed     Over the counter     Anti-histamines, ideally non sedating second generation, like loratadine (claritin) cetirizine (zyrtec) fexofenadine (Allegra)  Ok to start Zithromax - 2Tabs day 1 &  Than 1TAB  once daily for 4 more day   Follow up in 2 weeks as needed  If not better

## 2020-01-07 ENCOUNTER — TELEPHONE (OUTPATIENT)
Dept: FAMILY MEDICINE | Facility: CLINIC | Age: 35
End: 2020-01-07

## 2020-01-07 DIAGNOSIS — F41.9 ANXIETY: ICD-10-CM

## 2020-01-07 DIAGNOSIS — F33.1 MODERATE EPISODE OF RECURRENT MAJOR DEPRESSIVE DISORDER (H): ICD-10-CM

## 2020-01-07 RX ORDER — FLUOXETINE 20 MG/1
20 TABLET, FILM COATED ORAL DAILY
Qty: 90 TABLET | Refills: 2 | Status: SHIPPED | OUTPATIENT
Start: 2020-01-07 | End: 2021-03-24

## 2020-01-07 NOTE — TELEPHONE ENCOUNTER
Sent remaining refills to [facility]  Cynthia CHEN RN    ----- Message -----   From: Angel Luis Reynolds   Sent: 1/7/2020  11:13 AM CST   To: Up Reception   Subject: RE: Rx transfer to [facility]                          I am currently on Fluoxetine, 20mg - must be the generic? thanks-   ----- Message -----   From: Cynthia CHEN   Sent: 1/7/2020 11:05 AM CST   To: Angel Luis Reynolds   Subject: RE: Rx transfer to [facility]   Dimas Neal,       Looks like you are just on Prozac right?      Cynthia CHEN RN         ----- Message -----      From: Angel Luis Reynolds      Sent: 1/7/2020 10:36 AM CST        To: Patient Customer Service Request Message List   Subject: RE: Rx transfer to [facility]      Topic: Procedural Question      Needing to transfer my Rx from [facility] in [facility] to [facility] in [facility]

## 2020-02-08 ENCOUNTER — MYC REFILL (OUTPATIENT)
Dept: FAMILY MEDICINE | Facility: CLINIC | Age: 35
End: 2020-02-08

## 2020-02-08 DIAGNOSIS — F33.1 MODERATE EPISODE OF RECURRENT MAJOR DEPRESSIVE DISORDER (H): ICD-10-CM

## 2020-02-08 DIAGNOSIS — F41.9 ANXIETY: ICD-10-CM

## 2020-02-08 RX ORDER — FLUOXETINE 20 MG/1
20 TABLET, FILM COATED ORAL DAILY
Qty: 90 TABLET | Refills: 2 | Status: CANCELLED | OUTPATIENT
Start: 2020-02-08

## 2020-02-10 NOTE — TELEPHONE ENCOUNTER
Pt would like this rx changed from a tab to a capsule [phone]  Ok to leave a message  Please send to      [id number] - [facility] [address] .

## 2020-02-10 NOTE — TELEPHONE ENCOUNTER
"FLUOXETINE  Last Written Prescription Date:  01/07/2020  Last Fill Quantity: 90,  # refills: 2   Last office visit: 10/30/2019 with prescribing provider:  YES   Future Office Visit:    Requested Prescriptions   Pending Prescriptions Disp Refills     FLUoxetine 20 MG tablet 90 tablet 2     Sig: Take 1 tablet (20 mg) by mouth daily       SSRIs Protocol Passed - 2/10/2020  9:56 AM        Passed - PHQ-9 score less than 5 in past 6 months     Please review last PHQ-9 score.           Passed - Medication is active on med list        Passed - Patient is age 18 or older        Passed - Recent (6 mo) or future (30 days) visit within the authorizing provider's specialty     Patient had office visit in the last 6 months or has a visit in the next 30 days with authorizing provider or within the authorizing provider's specialty.  See \"Patient Info\" tab in inbasket, or \"Choose Columns\" in Meds & Orders section of the refill encounter.            "

## 2020-02-10 NOTE — TELEPHONE ENCOUNTER
DODIE.S,   See MyChart from pt   Needs Rx changed from tablet to capsule  Pended Rx if you approve of change  Thanks,  Cynthia CHEN RN

## 2020-02-11 NOTE — TELEPHONE ENCOUNTER
Hi    Please review , the message is sent back to me,  whats needed- please let me know    (F41.9) Anxiety  Plan: FLUoxetine (PROZAC) 20 MG capsule      (F33.1) Moderate episode of recurrent major depressive disorder (H)  Plan: FLUoxetine (PROZAC) 20 MG capsule

## 2020-12-27 ENCOUNTER — HEALTH MAINTENANCE LETTER (OUTPATIENT)
Age: 35
End: 2020-12-27

## 2021-03-24 ENCOUNTER — OFFICE VISIT (OUTPATIENT)
Dept: FAMILY MEDICINE | Facility: CLINIC | Age: 36
End: 2021-03-24
Payer: COMMERCIAL

## 2021-03-24 VITALS
TEMPERATURE: 98.4 F | DIASTOLIC BLOOD PRESSURE: 89 MMHG | RESPIRATION RATE: 16 BRPM | HEIGHT: 71 IN | SYSTOLIC BLOOD PRESSURE: 138 MMHG | BODY MASS INDEX: 27.44 KG/M2 | HEART RATE: 97 BPM | WEIGHT: 196 LBS | OXYGEN SATURATION: 100 %

## 2021-03-24 DIAGNOSIS — R03.0 ELEVATED BP WITHOUT DIAGNOSIS OF HYPERTENSION: ICD-10-CM

## 2021-03-24 DIAGNOSIS — F41.9 ANXIETY: ICD-10-CM

## 2021-03-24 DIAGNOSIS — Z82.49 FAMILY HISTORY OF HYPERTENSION: ICD-10-CM

## 2021-03-24 DIAGNOSIS — Z00.00 ROUTINE GENERAL MEDICAL EXAMINATION AT A HEALTH CARE FACILITY: Primary | ICD-10-CM

## 2021-03-24 DIAGNOSIS — Z78.9 ALCOHOL USE: ICD-10-CM

## 2021-03-24 DIAGNOSIS — F33.1 MODERATE EPISODE OF RECURRENT MAJOR DEPRESSIVE DISORDER (H): ICD-10-CM

## 2021-03-24 PROCEDURE — 99395 PREV VISIT EST AGE 18-39: CPT | Performed by: FAMILY MEDICINE

## 2021-03-24 PROCEDURE — 96127 BRIEF EMOTIONAL/BEHAV ASSMT: CPT | Performed by: FAMILY MEDICINE

## 2021-03-24 ASSESSMENT — PATIENT HEALTH QUESTIONNAIRE - PHQ9
SUM OF ALL RESPONSES TO PHQ QUESTIONS 1-9: 0
SUM OF ALL RESPONSES TO PHQ QUESTIONS 1-9: 0
5. POOR APPETITE OR OVEREATING: NOT AT ALL
10. IF YOU CHECKED OFF ANY PROBLEMS, HOW DIFFICULT HAVE THESE PROBLEMS MADE IT FOR YOU TO DO YOUR WORK, TAKE CARE OF THINGS AT HOME, OR GET ALONG WITH OTHER PEOPLE: NOT DIFFICULT AT ALL

## 2021-03-24 ASSESSMENT — MIFFLIN-ST. JEOR: SCORE: 1843.24

## 2021-03-24 ASSESSMENT — ANXIETY QUESTIONNAIRES
5. BEING SO RESTLESS THAT IT IS HARD TO SIT STILL: SEVERAL DAYS
3. WORRYING TOO MUCH ABOUT DIFFERENT THINGS: NOT AT ALL
GAD7 TOTAL SCORE: 3
2. NOT BEING ABLE TO STOP OR CONTROL WORRYING: NOT AT ALL
7. FEELING AFRAID AS IF SOMETHING AWFUL MIGHT HAPPEN: NOT AT ALL
1. FEELING NERVOUS, ANXIOUS, OR ON EDGE: SEVERAL DAYS
IF YOU CHECKED OFF ANY PROBLEMS ON THIS QUESTIONNAIRE, HOW DIFFICULT HAVE THESE PROBLEMS MADE IT FOR YOU TO DO YOUR WORK, TAKE CARE OF THINGS AT HOME, OR GET ALONG WITH OTHER PEOPLE: NOT DIFFICULT AT ALL
6. BECOMING EASILY ANNOYED OR IRRITABLE: SEVERAL DAYS

## 2021-03-24 NOTE — PROGRESS NOTES
SUBJECTIVE:   CC: Angel Luis Reynolds is an 35 year old male who presents for preventative health visit.       Patient has been advised of split billing requirements and indicates understanding: Yes  Healthy Habits:     Getting at least 3 servings of Calcium per day:  Yes    Bi-annual eye exam:  NO    Dental care twice a year:  Yes    Sleep apnea or symptoms of sleep apnea:  None    Diet:  Regular (no restrictions)    Frequency of exercise:  2-3 days/week    Duration of exercise:  15-30 minutes    Medication side effects:  None    PHQ-2 Total Score: 0    Additional concerns today:  No    Depression and anxiety- stable  Needs refill on Prozac 20 mg caps- more cost effective than tabs.  Prozac at bedtime - no concerns with fatigue- unlike sertraline  he denies suicidal thoughts or ideation.reports no side effects from medications. Would like to continue.    Has noted his weight is higher   Younger brother started on medications for Blood pressure  Dad has hypertension  Not ready for any blood test, today.  Does admit weight gain because of sedentary life style, wants to change it.  Has [place] at home.  Works from  Home.      Today's PHQ-2 Score:   PHQ-2 ( 1999 Pfizer) 3/22/2021   Q1: Little interest or pleasure in doing things 0   Q2: Feeling down, depressed or hopeless 0   PHQ-2 Score 0   Q1: Little interest or pleasure in doing things Not at all   Q2: Feeling down, depressed or hopeless Not at all   PHQ-2 Score 0       Abuse: Current or Past(Physical, Sexual or Emotional)- No  Do you feel safe in your environment? Yes    Have you ever done Advance Care Planning? (For example, a Health Directive, POLST, or a discussion with a medical provider or your loved ones about your wishes): No, advance care planning information given to patient to review.  Patient plans to discuss their wishes with loved ones or provider.      Social History     Tobacco Use     Smoking status: Never Smoker     Smokeless tobacco: Never Used    Substance Use Topics     Alcohol use: Yes     Comment: Socially      If you drink alcohol do you typically have >3 drinks per day or >7 drinks per week? No      AUDIT - Alcohol Use Disorders Identification Test - Reproduced from the World Health Organization Audit 2001 (Second Edition) 3/22/2021   1.  How often do you have a drink containing alcohol? 4 or more times a week   2.  How many drinks containing alcohol do you have on a typical day when you are drinking? 3 or 4   3.  How often do you have five or more drinks on one occasion? Less than monthly   4.  How often during the last year have you found that you were not able to stop drinking once you had started? Never   5.  How often during the last year have you failed to do what was normally expected of you because of drinking? Never   6.  How often during the last year have you needed a first drink in the morning to get yourself going after a heavy drinking session? Never   7.  How often during the last year have you had a feeling of guilt or remorse after drinking? Never   8.  How often during the last year have you been unable to remember what happened the night before because of your drinking? Never   9.  Have you or someone else been injured because of your drinking? No   10. Has a relative, friend, doctor or other health care worker been concerned about your drinking or suggested you cut down? No   TOTAL SCORE 6       Last PSA: No results found for: PSA    Reviewed orders with patient. Reviewed health maintenance and updated orders accordingly - Yes  BP Readings from Last 3 Encounters:   03/24/21 138/89   10/30/19 126/87   10/07/19 124/79    Wt Readings from Last 3 Encounters:   03/24/21 88.9 kg (196 lb)   10/30/19 84.4 kg (186 lb 1.6 oz)   10/07/19 84 kg (185 lb 4 oz)                    Reviewed and updated as needed this visit by clinical staff  Tobacco  Allergies  Meds  Problems  Med Hx  Surg Hx  Fam Hx  Soc Hx          Reviewed and updated as  "needed this visit by Provider  Tobacco  Allergies  Meds  Problems  Med Hx  Surg Hx  Fam Hx             Review of Systems  CONSTITUTIONAL: NEGATIVE for fever, chills, change in weight  INTEGUMENTARY/SKIN: NEGATIVE for worrisome rashes, moles or lesions  EYES: NEGATIVE for vision changes or irritation  ENT: NEGATIVE for ear, mouth and throat problems  RESP: NEGATIVE for significant cough or SOB  CV: NEGATIVE for chest pain, palpitations or peripheral edema  GI: NEGATIVE for nausea, abdominal pain, heartburn, or change in bowel habits   male: negative for dysuria, hematuria, decreased urinary stream, erectile dysfunction, urethral discharge  MUSCULOSKELETAL: NEGATIVE for significant arthralgias or myalgia  NEURO: NEGATIVE for weakness, dizziness or paresthesias  PSYCHIATRIC: NEGATIVE for changes in mood or affect    OBJECTIVE:   /89   Pulse 97   Temp 98.4  F (36.9  C) (Tympanic)   Resp 16   Ht 1.791 m (5' 10.5\")   Wt 88.9 kg (196 lb)   SpO2 100%   BMI 27.73 kg/m      Physical Exam  GENERAL: healthy, alert and no distress  EYES: Eyes grossly normal to inspection, PERRL and conjunctivae and sclerae normal  HENT: ear canals and TM's normal, nose and mouth without ulcers or lesions  NECK: no adenopathy, no asymmetry, masses, or scars and thyroid normal to palpation  RESP: lungs clear to auscultation - no rales, rhonchi or wheezes  CV: regular rate and rhythm, normal S1 S2, no S3 or S4, no murmur, click or rub, no peripheral edema and peripheral pulses strong  ABDOMEN: soft, nontender, no hepatosplenomegaly, no masses and bowel sounds normal  MS: no gross musculoskeletal defects noted, no edema  SKIN: no suspicious lesions or rashes  NEURO: Normal strength and tone, mentation intact and speech normal  PSYCH: mentation appears normal, affect normal/bright    Diagnostic Test Results:  Labs reviewed in Epic  none     ASSESSMENT/PLAN:   Angel Luis was seen today for physical.    Diagnoses and all orders for " "this visit:    Routine general medical examination at a health care facility  Screen for Diabetes  Fasting lipid - as lab only ap at patient discretion in next 1-4 months, labs future    Anxiety  Comments:  Stablel on [id number] mg. Refill for 1 yr is given  Orders:  -     FLUoxetine (PROZAC) 20 MG capsule; TAKE ONE CAPSULE BY MOUTH ONCE DAILY  -     EMOTIONAL / BEHAVIORAL ASSESSMENT    Moderate episode of recurrent major depressive disorder (H)  Comments:  Stablel on [id number] mg. Refill for 1 yr is given  Orders:  -     FLUoxetine (PROZAC) 20 MG capsule; TAKE ONE CAPSULE BY MOUTH ONCE DAILY  -     EMOTIONAL / BEHAVIORAL ASSESSMENT    Elevated BP without diagnosis of hypertension  Comments:  Detail discussion about high Blood pressure   Counseling regarding lifestyle changes.   Encouraged low salt diet.  Check BP on own if possible, he will look into owning a Blood pressure cuff & willing to check 1-2/month  Follow up in 4 months.   Earlier if weekly BP is consistently >139/89.    Orders:  -     **Comprehensive metabolic panel FUTURE anytime; Future  -     Lipid panel reflex to direct LDL Fasting; Future    Family history of hypertension  Comments:  Dad & 2 yys younger brother with high bp and on med   Orders:  -     **Comprehensive metabolic panel FUTURE anytime; Future    Alcohol use  Comments:  Nightly a few drinks.   I did counseling to avoid use for health  reasons, & he is willing to cut back. no concerns with alcohol abuse  Orders:  -     GGT; Future        Patient has been advised of split billing requirements and indicates understanding: Yes  COUNSELING:   Reviewed preventive health counseling, as reflected in patient instructions       Regular exercise       Healthy diet/nutrition       Vision screening       Hearing screening       Alcohol Use     Estimated body mass index is 27.73 kg/m  as calculated from the following:    Height as of this encounter: 1.791 m (5' 10.5\").    Weight as of this encounter: " 88.9 kg (196 lb).     Weight management plan: Discussed healthy diet and exercise guidelines    He reports that he has never smoked. He has never used smokeless tobacco.      Counseling Resources:  ATP IV Guidelines  Pooled Cohorts Equation Calculator  FRAX Risk Assessment  ICSI Preventive Guidelines  Dietary Guidelines for Americans, 2010  USDA's MyPlate  ASA Prophylaxis  Lung CA Screening    Magaly Alexander MD  [facility]

## 2021-03-24 NOTE — ASSESSMENT & PLAN NOTE
Recheck on own periodically and make an appointment with provider if Blood pressure consecutively > 139/89

## 2021-03-24 NOTE — PATIENT INSTRUCTIONS
We can check for diabetes and high cholesterol if you come fasting for 10 hours during which time you can drink water only but no other food or fluids, including snacks. You do need lab only appointment so call ahead of time.    -Normal  BP is  119/79 or lower. Upper number is systolic Blood pressure (SBP). Lower number is diastolic  Blood pressure (DBP)  -Blood pressure between 120-139/80-89 (prehypertensive blood pressure/ class 1 hypertension )   -Hypertensive is  BP of 140/90 or above and needs treatment with medication.  -life style changes that are beneficial to reach goal of normal Blood pressure   1.Weight loss of 1 kg can reduce systolic Blood pressure  (SBP) by 1 mmHg  2.Health healthy diet (eg DASH dietary pattern can reduce SBP by 11 mmHg)  3.Structured excercise program (150 mins aerobic activity/week can reduce SBP by 5 mmHg)  4.Low salt  diet - very important.(eg: cut by 255 or 1000 mg/day to reduce SBP by 5mmHg)  5. Increase 4-5 serving of fresh vegetables & fruits /day- good source of potassium.    Reduction of alcohol     if More than 140/90  after a month of above life style changes  Return visit with MD/provider  for recheck & consideration of medications .       Preventive Health Recommendations  Male Ages 26 - 39    Yearly exam:             See your health care provider every year in order to  o   Review health changes.   o   Discuss preventive care.    o   Review your medicines if your doctor has prescribed any.    You should be tested each year for STDs (sexually transmitted diseases), if you re at risk.     After age 35, talk to your provider about cholesterol testing. If you are at risk for heart disease, have your cholesterol tested at least every 5 years.     If you are at risk for diabetes, you should have a diabetes test (fasting glucose).  Shots: Get a flu shot each year. Get a tetanus shot every 10 years.     Nutrition:    Eat at least 5 servings of fruits and vegetables daily.      Eat whole-grain bread, whole-wheat pasta and brown rice instead of white grains and rice.     Get adequate Calcium and Vitamin D.     Lifestyle    Exercise for at least 150 minutes a week (30 minutes a day, 5 days a week). This will help you control your weight and prevent disease.     Limit alcohol to one drink per day.     No smoking.     Wear sunscreen to prevent skin cancer.     See your dentist every six months for an exam and cleaning.

## 2021-03-25 ASSESSMENT — ANXIETY QUESTIONNAIRES: GAD7 TOTAL SCORE: 3

## 2021-06-08 ENCOUNTER — MYC MEDICAL ADVICE (OUTPATIENT)
Dept: FAMILY MEDICINE | Facility: CLINIC | Age: 36
End: 2021-06-08

## 2021-10-09 ENCOUNTER — HEALTH MAINTENANCE LETTER (OUTPATIENT)
Age: 36
End: 2021-10-09

## 2022-01-02 ENCOUNTER — OFFICE VISIT (OUTPATIENT)
Dept: URGENT CARE | Facility: URGENT CARE | Age: 37
End: 2022-01-02
Payer: COMMERCIAL

## 2022-01-02 VITALS
SYSTOLIC BLOOD PRESSURE: 132 MMHG | TEMPERATURE: 100.5 F | OXYGEN SATURATION: 97 % | HEART RATE: 114 BPM | DIASTOLIC BLOOD PRESSURE: 87 MMHG

## 2022-01-02 DIAGNOSIS — D72.819 LEUKOPENIA, UNSPECIFIED TYPE: ICD-10-CM

## 2022-01-02 DIAGNOSIS — R50.9 FEVER, UNSPECIFIED: Primary | ICD-10-CM

## 2022-01-02 LAB
BASOPHILS # BLD AUTO: 0 10E3/UL (ref 0–0.2)
BASOPHILS NFR BLD AUTO: 1 %
EOSINOPHIL # BLD AUTO: 0 10E3/UL (ref 0–0.7)
EOSINOPHIL NFR BLD AUTO: 0 %
ERYTHROCYTE [DISTWIDTH] IN BLOOD BY AUTOMATED COUNT: 12.7 % (ref 10–15)
FLUAV AG SPEC QL IA: NEGATIVE
FLUBV AG SPEC QL IA: NEGATIVE
HCT VFR BLD AUTO: 39.3 % (ref 40–53)
HGB BLD-MCNC: 13.6 G/DL (ref 13.3–17.7)
LYMPHOCYTES # BLD AUTO: 0.6 10E3/UL (ref 0.8–5.3)
LYMPHOCYTES NFR BLD AUTO: 17 %
MCH RBC QN AUTO: 31.9 PG (ref 26.5–33)
MCHC RBC AUTO-ENTMCNC: 34.6 G/DL (ref 31.5–36.5)
MCV RBC AUTO: 92 FL (ref 78–100)
MONOCYTES # BLD AUTO: 0.3 10E3/UL (ref 0–1.3)
MONOCYTES NFR BLD AUTO: 8 %
NEUTROPHILS # BLD AUTO: 2.6 10E3/UL (ref 1.6–8.3)
NEUTROPHILS NFR BLD AUTO: 75 %
PLATELET # BLD AUTO: 122 10E3/UL (ref 150–450)
RBC # BLD AUTO: 4.26 10E6/UL (ref 4.4–5.9)
SARS-COV-2 RNA RESP QL NAA+PROBE: NEGATIVE
WBC # BLD AUTO: 3.4 10E3/UL (ref 4–11)

## 2022-01-02 PROCEDURE — U0003 INFECTIOUS AGENT DETECTION BY NUCLEIC ACID (DNA OR RNA); SEVERE ACUTE RESPIRATORY SYNDROME CORONAVIRUS 2 (SARS-COV-2) (CORONAVIRUS DISEASE [COVID-19]), AMPLIFIED PROBE TECHNIQUE, MAKING USE OF HIGH THROUGHPUT TECHNOLOGIES AS DESCRIBED BY CMS-2020-01-R: HCPCS | Performed by: PHYSICIAN ASSISTANT

## 2022-01-02 PROCEDURE — U0005 INFEC AGEN DETEC AMPLI PROBE: HCPCS | Performed by: PHYSICIAN ASSISTANT

## 2022-01-02 PROCEDURE — 87804 INFLUENZA ASSAY W/OPTIC: CPT | Performed by: PHYSICIAN ASSISTANT

## 2022-01-02 PROCEDURE — 36415 COLL VENOUS BLD VENIPUNCTURE: CPT | Performed by: PHYSICIAN ASSISTANT

## 2022-01-02 PROCEDURE — 85025 COMPLETE CBC W/AUTO DIFF WBC: CPT | Performed by: PHYSICIAN ASSISTANT

## 2022-01-02 PROCEDURE — 99213 OFFICE O/P EST LOW 20 MIN: CPT | Performed by: PHYSICIAN ASSISTANT

## 2022-01-02 ASSESSMENT — ENCOUNTER SYMPTOMS
SORE THROAT: 0
MYALGIAS: 0
RHINORRHEA: 0
APPETITE CHANGE: 0
SHORTNESS OF BREATH: 0
FEVER: 1
DYSURIA: 0
HEADACHES: 0
COUGH: 0

## 2022-01-02 NOTE — PROGRESS NOTES
SUBJECTIVE:   Angel Luis Reynolds is a 36 year old male presenting with a chief complaint of   Chief Complaint   Patient presents with     Urgent Care     Fever     fever for a few days, denies coughing.        He is an established patient of [facility].   Patient presents with complaints of fever and malaise x 5 days.  Vaccinated for covid and influenza.  No one ill at home.  No other symptoms.      Treatment:  Ibuprofen and tylenol      Review of Systems   Constitutional: Positive for fever. Negative for appetite change.   HENT: Negative for congestion, ear pain, rhinorrhea and sore throat.    Respiratory: Negative for cough and shortness of breath.    Genitourinary: Negative for dysuria.   Musculoskeletal: Negative for myalgias.   Skin: Negative for rash.   Neurological: Negative for headaches.   All other systems reviewed and are negative.      No past medical history on file.  Family History   Problem Relation Age of Onset     Hypertension Father      Breast Cancer Maternal Grandmother      Coronary Artery Disease Paternal Grandfather      Diabetes Paternal Grandfather      Other - See Comments Paternal Grandfather         Stroke      Current Outpatient Medications   Medication Sig Dispense Refill     FLUoxetine (PROZAC) 20 MG capsule TAKE ONE CAPSULE BY MOUTH ONCE DAILY 90 capsule 3     Social History     Tobacco Use     Smoking status: Never Smoker     Smokeless tobacco: Never Used   Substance Use Topics     Alcohol use: Yes     Comment: Socially        OBJECTIVE  /87   Pulse 114   Temp (!) 100.5  F (38.1  C) (Oral)   SpO2 97%     Physical Exam  Vitals and nursing note reviewed.   Constitutional:       Appearance: Normal appearance. He is normal weight.   HENT:      Head: Normocephalic and atraumatic.      Right Ear: Tympanic membrane, ear canal and external ear normal.      Left Ear: Tympanic membrane, ear canal and external ear normal.      Mouth/Throat:      Mouth: Mucous membranes are moist.       Pharynx: Oropharynx is clear.   Eyes:      Extraocular Movements: Extraocular movements intact.      Conjunctiva/sclera: Conjunctivae normal.   Cardiovascular:      Rate and Rhythm: Normal rate and regular rhythm.      Pulses: Normal pulses.      Heart sounds: Normal heart sounds.   Pulmonary:      Effort: Pulmonary effort is normal.      Breath sounds: Normal breath sounds.   Musculoskeletal:         General: Normal range of motion.      Cervical back: Normal range of motion and neck supple.   Skin:     General: Skin is warm and dry.   Neurological:      General: No focal deficit present.      Mental Status: He is alert.   Psychiatric:         Mood and Affect: Mood normal.         Behavior: Behavior normal.         Labs:  Results for orders placed or performed in visit on 01/02/22 (from the past 24 hour(s))   Influenza A/B antigen    Specimen: Nasopharyngeal; Swab   Result Value Ref Range    Influenza A antigen Negative Negative    Influenza B antigen Negative Negative    Narrative    Test results must be correlated with clinical data. If necessary, results should be confirmed by a molecular assay or viral culture.   CBC with platelets and differential    Narrative    The following orders were created for panel order CBC with platelets and differential.  Procedure                               Abnormality         Status                     ---------                               -----------         ------                     CBC with platelets and d...[[id number]]  Abnormal            Final result                 Please view results for these tests on the individual orders.   CBC with platelets and differential   Result Value Ref Range    WBC Count 3.4 (L) 4.0 - 11.0 10e3/uL    RBC Count 4.26 (L) 4.40 - 5.90 10e6/uL    Hemoglobin 13.6 13.3 - 17.7 g/dL    Hematocrit 39.3 (L) 40.0 - 53.0 %    MCV 92 78 - 100 fL    MCH 31.9 26.5 - 33.0 pg    MCHC 34.6 31.5 - 36.5 g/dL    RDW 12.7 10.0 - 15.0 %    Platelet Count 122 (L)  150 - 450 10e3/uL    % Neutrophils 75 %    % Lymphocytes 17 %    % Monocytes 8 %    % Eosinophils 0 %    % Basophils 1 %    Absolute Neutrophils 2.6 1.6 - 8.3 10e3/uL    Absolute Lymphocytes 0.6 (L) 0.8 - 5.3 10e3/uL    Absolute Monocytes 0.3 0.0 - 1.3 10e3/uL    Absolute Eosinophils 0.0 0.0 - 0.7 10e3/uL    Absolute Basophils 0.0 0.0 - 0.2 10e3/uL       X-Ray was not done.    ASSESSMENT:      ICD-10-CM    1. Fever, unspecified  R50.9 Symptomatic; Unknown COVID-19 Virus (Coronavirus) by PCR Nose     Influenza A/B antigen     CBC with platelets and differential     CBC with platelets and differential   2. Leukopenia, unspecified type  D72.819         Medical Decision Making:    Differential Diagnosis:  URI Adult/Peds:  Influenza, Viral syndrome and covid    Serious Comorbid Conditions:  Adult:  reviewed    PLAN:    Discussed reasons to seek immediate medical attention.  Additionally if no improvement or worsening in one week, may follow up with PCP and/or UC.    Discussed and recommended CDC guidelines for self isolation.  COVID test pending.    Recommended repeat CBC in 1-2 weeks.      Followup:    If not improving or if condition worsens, follow up with your Primary Care Provider, If not improving or if conditions worsens over the next 12-24 hours, go to the Emergency Department    Patient Instructions     Patient Education   After Your COVID-19 (Coronavirus) Test  You have been tested for COVID-19 (coronavirus).   If you'll have surgery in the next few days, we'll let you know ahead of time if you have the virus. Please call your surgeon's office with any questions.  For all other patients: Results are usually available in [employer] within 2 to 3 days.   If you do not have a [employer] account, you'll get a letter in the mail in about 7 to 10 days.   Wiki-[employer]t is often the fastest way to get test results. Please sign up if you do not already have a [employer] account. See the handout Getting COVID-19 Test Results in [employer]  "for help.  What if my test result is positive?  If your test is positive and you have not viewed your result in [employer], you'll get a phone call with your result. (A positive test means that you have the virus.)     Follow the tips under \"How do I self-isolate?\" below for 10 days (20 days if you have a weak immune system).    You don't need to be retested for COVID-19 before going back to school or work. As long as you're fever-free and feeling better, you can go back to school, work and other activities after waiting the 10 or 20 days.  What if I have questions after I get my results?  If you have questions about your results, please visit our testing website at [email or web address].   After 7 to 10 days, if you have not gotten your results:     Call [phone] ([phone]) and ask to speak with our COVID-19 results team.    If you're being treated at an infusion center: Call your infusion center directly.  What are the symptoms of COVID-19?  Cough, fever and trouble breathing are the most common signs of COVID-19.  Other symptoms can include new headaches, new muscle or body aches, new and unexplained fatigue (feeling very tired), chills, sore throat, congestion (stuffy or runny nose), diarrhea (loose poop), loss of taste or smell, belly pain, and nausea or vomiting (feeling sick to your stomach or throwing up).  You may already have symptoms of COVID-19, or they may show up later.  What should I do if I have symptoms?  If you're having surgery: Call your surgeon's office.  For all other patients: Stay home and away from others (self-isolate) until ...    You've had no fever--and no medicine that reduces fever--for 1 full day (24 hours), AND    Other symptoms have gotten better. For example, your cough or breathing has improved, AND    At least 10 days have passed since your symptoms first started.  How do I self-isolate?    Stay in your own room, even for meals. Use your own " "bathroom if you can.    Stay away from others in your home. No hugging, kissing or shaking hands. No visitors.    Don't go to work, school or anywhere else.    Clean \"high touch\" surfaces often (doorknobs, counters, handles). Use household cleaning spray or wipes. You'll find a full list of  on the EPA website: www.epa.gov/pesticide-registration/list-n-disinfectants-use-against-sars-cov-2.    Cover your mouth and nose with a mask or other face covering to avoid spreading germs.    Wash your hands and face often. Use soap and water.    Caregivers in these groups are at risk for severe illness due to COVID-19:  ? People 65 years and older  ? People who live in a nursing home or long-term care facility  ? People with chronic disease (lung, heart, cancer, diabetes, kidney, liver, immunologic)  ? People who have a weakened immune system, including those who:    Are in cancer treatment    Take medicine that weakens the immune system, such as corticosteroids    Had a bone marrow or organ transplant    Have an immune deficiency    Have poorly controlled HIV or AIDS    Are obese (body mass index of 40 or higher)    Smoke regularly    Caregivers should wear gloves while washing dishes, handling laundry and cleaning bedrooms and bathrooms.    Use caution when washing and drying laundry: Don't shake dirty laundry and use the warmest water setting that you can.    For more tips on managing your health at home, go to www.cdc.gov/coronavirus/2019-ncov/downloads/10Things.pdf.  How can I take care of myself at home?  1. Get lots of rest. Drink extra fluids (unless a doctor has told you not to).  2. Take Tylenol (acetaminophen) for fever or pain. If you have liver or kidney problems, ask your family doctor if it's OK to take Tylenol.   Adults can take either:  ? 650 mg (two 325 mg pills) every 4 to 6 hours, or   ? 1,000 mg (two 500 mg pills) every 8 hours as needed.  ? Note: Don't take more than 3,000 mg in one day. " Acetaminophen is found in many medicines (both prescribed and over-the-counter medicines). Read all labels to be sure you don't take too much.   For children, check the Tylenol bottle for the right dose. The dose is based on the child's age or weight.  3. If you have other health problems (like cancer, heart failure, an organ transplant or severe kidney disease): Call your specialty clinic if you don't feel better in the next 2 days.  4. Know when to call 911. Emergency warning signs include:  ? Trouble breathing or shortness of breath  ? Chest pain or pressure that doesn't go away  ? Feeling confused like you haven't felt before, or not being able to wake up  ? Bluish-colored lips or face  5. If your doctor prescribed a blood thinner medicine: Follow their instructions.  Where can I get more information?    [facility] - About COVID-19:   [email or web address]    CDC - If You're Sick: cdc.gov/coronavirus/2019-ncov/about/steps-when-sick.html    CDC - Ending Home Isolation: www.cdc.gov/coronavirus/2019-ncov/hcp/disposition-in-home-patients.html    CDC - Caring for Someone: www.cdc.gov/coronavirus/2019-ncov/if-you-are-sick/care-for-someone.html    [facility] - Interim Guidance for Hospital Discharge to Home: www.health.[facility].mn.us/diseases/coronavirus/hcp/hospdischarge.pdf    [facility] clinical trials (COVID-19 research studies): [email or web address].[email or web address]-clinical-trials    Below are the COVID-19 hotlines at the [facility] ([facility]). Interpreters are available.  ? For health questions: Call 719-930-4058 or 1-633.998.9305 (7 a.m. to 7 p.m.)  ? For questions about schools and childcare: Call 960-050-7338 or [phone] (7 a.m. to 7 p.m.)    For informational purposes only. Not to replace the advice of your health care provider. Clinically reviewed by Infection Prevention and the [facility] COVID-19 Clinical Team. Copyright   2020 [facility]. All rights  reserved. [employer] 701474 - Rev 11/11/20.       Patient Education     Febrile Illness with Uncertain Cause (Adult)   You have a fever, but the cause is unknown. A fever is the body's natural reaction to an illness such as infection due to a virus or bacteria. Sometimes other conditions such as cancer or immune diseases can cause fever. This might be more likely if your fever has lasted for more than a week or 2. In most cases, the higher temperature itself isn't harmful. It actually helps the body fight infections. A fever doesn't need to be treated unless you feel very uncomfortable.   Sometimes a fever can be an early sign of a more serious infection. So follow up if your condition gets worse.   Home care  Unless given other instructions by your healthcare provider, follow these guidelines when caring for yourself at home.   General care    If your symptoms are not severe, rest at home for the first 2 to 3 days. When you are active again, don't let yourself get too tired.    For your overall health, don't smoke. Also stay away from secondhand smoke.    You may not feel like eating too much. So a light diet is fine. Stay hydrated by drinking 6 to 8 glasses of fluids per day. This includes water, soft drinks, sports drinks, juices, tea, or soup. If you have congestion, extra fluids will help loosen secretions in the nose and lungs.  Medicines    You can take acetaminophen or ibuprofen for pain or to lower your temperature, unless you were given a different medicine to use. If you have chronic liver or kidney disease or have ever had a stomach ulcer or gastrointestinal bleeding, talk with your healthcare provider before using these medicines. Also talk with your provider if you are taking medicine to prevent blood clots. Don't give aspirin to anyone younger than age 19 unless directed by the provider. It may cause a serious illness called Reye syndrome. This most often affects the brain and liver.    If you were  given antibiotics for an infection, take them until they are used up, or your healthcare provider tells you to stop. It's important to finish the antibiotics even if you feel better. This is to make sure the infection has cleared. Antibiotics are not often given for a viral infection or a fever with an unknown cause.    Over-the-counter medicines will not shorten the length of the illness. But they may be help with symptoms. These include cough, sore throat, or nasal and sinus congestion. Ask your pharmacist for product suggestions. Don't use decongestants if you have high blood pressure.    Follow-up care  Follow up with your healthcare provider, or as advised.    If a culture or other lab tests were done, you will be told if your treatment needs to be changed. Call your healthcare provider as directed for the results.    If X-rays, a CT scan, MRI, or an ultrasound were done, a specialist will review them. You will be told of any findings that may affect your care. Call your healthcare provider as directed for the results.  Call 911  Call 911if any of these occur:     Trouble breathing or swallowing, or wheezing    Chest pain    Confusion    Extreme drowsiness or trouble waking up    Fainting or loss of consciousness    Fast heart rate    Low blood pressure    Vomiting blood, or large amounts of blood in stool    Seizure  When to get medical advice  Call your healthcare provider right away if any of these occur:     Cough with lots of colored sputum (mucus) or blood in your sputum    Severe headache    Face, neck, throat, or ear pain    Feeling drowsy    Belly pain    Repeated vomiting or diarrhea; bloody diarrhea    Joint pain or a new rash    Burning when urinating    Fever of 100.4 F (38 C) or higher, or as directed by your provider    Shaking chills    Feeling weak or dizzy  [employer] last reviewed this educational content on 1/1/2020 2000-2021 [employer]. All rights reserved. This information  is not intended as a substitute for professional medical care. Always follow your healthcare professional's instructions.           Patient Education     Neutropenia  White blood cells (WBCs) help protect the body from infection. Neutrophils are a type of white blood cell. Their main job is to help the body fight bacterial and fungal infections. Neutropenia occurs when there are fewer neutrophils in the blood than normal. It can range from mild to severe. This depends on the number of neutrophils in the blood. Severe neutropenia puts a person at higher risk for having more infections. Bacterial and fungal infections are most common. Your healthcare provider can tell you more about your condition and whether it needs to be treated.     What causes neutropenia?  There are 2 main types of neutropenia: congenital and acquired. Each type has many causes:     Congenital neutropenia. These are the types that are present at birth. They are caused by certain rare genetic conditions, such as Kostmann syndrome. Most often the neutropenia is mild and normal for certain ethnic groups.    Acquired neutropenia. This type is not present at birth. Causes include:  ? Certain medicines, such as antibiotics and chemotherapy medicines  ? Certain autoimmune conditions  ? Certain viral, bacterial, or parasitic infections  ? Too little folate or vitamin B-12 in the diet  ? Underlying bone marrow problem, such as leukemia or myelodysplastic syndrome (MDS)  ? Other causes  How is neutropenia diagnosed?  Your healthcare provider may check for neutropenia if you have frequent infections. Your provider may also check for neutropenia if you re having certain treatments, such as chemotherapy, which is known to cause a lower neutrophil count. Tests will be done to confirm the problem. These may include:     A complete blood cell count (CBC). This test measures the amounts of the different types of cells in your blood. This includes the WBCs. The  WBC count can be broken down further to find the number of neutrophils and immature neutrophils (bands) in your blood. This is called an absolute neutrophil count (ANC).    A blood smear. This test checks for the different types of blood cells in your blood and how they appear. A sample of your blood is spread on a glass slide and viewed under a microscope. A stain is used so the blood cells can be seen.    A bone marrow aspiration and biopsy. This test checks for problems with how your bone marrow makes blood cells. A needle is used to remove a sample of the bone marrow in your hipbone. The sample is then sent to a lab to be tested for problems.  How is neutropenia treated?    If there is a clear cause of neutropenia, it is addressed. For instance, if a medicine is the cause, it may be stopped or changed.    For mild cases, often no treatment is needed.    For moderate to severe cases, treatment is likely needed. This may include:  ? G-CSF (granulocyte-colony stimulating factor). This is a special type of protein. It helps promote the growth and activity of neutrophils. G-CSF is given by injection.  ? Bone marrow transplant. This treatment replaces diseased bone marrow cells with healthy cells from a matched donor. This treatment is done only in specific severe cases.    What is the long-term outcome of neutropenia?  The outcome of neutropenia varies for each person. For some people, neutropenia may resolve after a few weeks or months. For other people, it may be long-lasting. In these cases, ongoing care and treatment is needed. Your healthcare provider will talk to you more about what to expect from your condition.   When to call your healthcare provider  Call your healthcare provider right away if you have any of the following:    Fever of 100.4 F (38 C) or higher. Call 911 or 24-hour urgent care. This is especially important if you have severe neutropenia, which puts you at higher risk for a life-threatening  "infection.    Cold sweat or chills    Chest pain or trouble breathing    Sore throat    Extreme tiredness or fatigue    Nausea and vomiting    Redness, warmth, or drainage from any open cuts or wounds    Pain or burning with urination; frequent urination    Pain, burning, or bleeding in the rectum    Severe constipation or diarrhea    Bloody stool or urine   How can I prevent infections?  With neutropenia, you need to take extra care to protect yourself from infection:     Wash your hands often, especially before eating and after using the bathroom. Use clean, running water and soap. Scrub for 20 seconds, or for as long as it takes to sing the \"Happy Birthday\" song from beginning to end, twice. Or use a hand gel that contains at least 60% alcohol.    Stay away from crowds and close contact with others who may be ill.    Cook meat and eggs all the way through to kill any germs.    Carefully wash raw fruits and vegetables.    Clean items you use often with disinfectant wipes. This includes phones and computer keyboards.    Don't touch your eyes, nose, and mouth, especially if your hands are not clean.    Practice good oral hygiene. Use a soft toothbrush. Also, brush and floss your teeth gently.    Always wipe from front to back after a bowel movement.    Bathe every day and use an unscented lotion to prevent cracked skin.    Keep cuts and scrapes clean and covered until they heal.    Don't share items such as drinks, eating utensils, towels, toothbrushes, razors, clothing, and sports equipment.    Store and handle foods safely to prevent food-borne illness.    Protect yourself against pet waste (urine and stool) by using vinyl gloves when cleaning. Always use gloves when gardening.    Ask your healthcare provider if you need to take antibiotics before and after having any dental or medical procedures.    Ask your healthcare provider if you need to wear a special mask near construction sites or farm areas.  [employer] " last reviewed this educational content on 4/1/2020 2000-2021 [employer]. All rights reserved. This information is not intended as a substitute for professional medical care. Always follow your healthcare professional's instructions.

## 2022-01-02 NOTE — PATIENT INSTRUCTIONS
"  Patient Education   After Your COVID-19 (Coronavirus) Test  You have been tested for COVID-19 (coronavirus).   If you'll have surgery in the next few days, we'll let you know ahead of time if you have the virus. Please call your surgeon's office with any questions.  For all other patients: Results are usually available in [employer] within 2 to 3 days.   If you do not have a [employer] account, you'll get a letter in the mail in about 7 to 10 days.   [employer] is often the fastest way to get test results. Please sign up if you do not already have a [employer] account. See the handout Getting COVID-19 Test Results in [facility] for help.  What if my test result is positive?  If your test is positive and you have not viewed your result in [facility], you'll get a phone call with your result. (A positive test means that you have the virus.)     Follow the tips under \"How do I self-isolate?\" below for 10 days (20 days if you have a weak immune system).    You don't need to be retested for COVID-19 before going back to school or work. As long as you're fever-free and feeling better, you can go back to school, work and other activities after waiting the 10 or 20 days.  What if I have questions after I get my results?  If you have questions about your results, please visit our testing website at [email or web address].   After 7 to 10 days, if you have not gotten your results:     Call [phone] ([phone]) and ask to speak with our COVID-19 results team.    If you're being treated at an infusion center: Call your infusion center directly.  What are the symptoms of COVID-19?  Cough, fever and trouble breathing are the most common signs of COVID-19.  Other symptoms can include new headaches, new muscle or body aches, new and unexplained fatigue (feeling very tired), chills, sore throat, congestion (stuffy or runny nose), diarrhea (loose poop), loss of taste or smell, belly pain, and nausea or vomiting " "(feeling sick to your stomach or throwing up).  You may already have symptoms of COVID-19, or they may show up later.  What should I do if I have symptoms?  If you're having surgery: Call your surgeon's office.  For all other patients: Stay home and away from others (self-isolate) until ...    You've had no fever--and no medicine that reduces fever--for 1 full day (24 hours), AND    Other symptoms have gotten better. For example, your cough or breathing has improved, AND    At least 10 days have passed since your symptoms first started.  How do I self-isolate?    Stay in your own room, even for meals. Use your own bathroom if you can.    Stay away from others in your home. No hugging, kissing or shaking hands. No visitors.    Don't go to work, school or anywhere else.    Clean \"high touch\" surfaces often (doorknobs, counters, handles). Use household cleaning spray or wipes. You'll find a full list of  on the EPA website: www.epa.gov/pesticide-registration/list-n-disinfectants-use-against-sars-cov-2.    Cover your mouth and nose with a mask or other face covering to avoid spreading germs.    Wash your hands and face often. Use soap and water.    Caregivers in these groups are at risk for severe illness due to COVID-19:  ? People 65 years and older  ? People who live in a nursing home or long-term care facility  ? People with chronic disease (lung, heart, cancer, diabetes, kidney, liver, immunologic)  ? People who have a weakened immune system, including those who:    Are in cancer treatment    Take medicine that weakens the immune system, such as corticosteroids    Had a bone marrow or organ transplant    Have an immune deficiency    Have poorly controlled HIV or AIDS    Are obese (body mass index of 40 or higher)    Smoke regularly    Caregivers should wear gloves while washing dishes, handling laundry and cleaning bedrooms and bathrooms.    Use caution when washing and drying laundry: Don't shake dirty " laundry and use the warmest water setting that you can.    For more tips on managing your health at home, go to www.cdc.gov/coronavirus/2019-ncov/downloads/10Things.pdf.  How can I take care of myself at home?  1. Get lots of rest. Drink extra fluids (unless a doctor has told you not to).  2. Take Tylenol (acetaminophen) for fever or pain. If you have liver or kidney problems, ask your family doctor if it's OK to take Tylenol.   Adults can take either:  ? 650 mg (two 325 mg pills) every 4 to 6 hours, or   ? 1,000 mg (two 500 mg pills) every 8 hours as needed.  ? Note: Don't take more than 3,000 mg in one day. Acetaminophen is found in many medicines (both prescribed and over-the-counter medicines). Read all labels to be sure you don't take too much.   For children, check the Tylenol bottle for the right dose. The dose is based on the child's age or weight.  3. If you have other health problems (like cancer, heart failure, an organ transplant or severe kidney disease): Call your specialty clinic if you don't feel better in the next 2 days.  4. Know when to call 911. Emergency warning signs include:  ? Trouble breathing or shortness of breath  ? Chest pain or pressure that doesn't go away  ? Feeling confused like you haven't felt before, or not being able to wake up  ? Bluish-colored lips or face  5. If your doctor prescribed a blood thinner medicine: Follow their instructions.  Where can I get more information?    [facility] - About COVID-19:   [email or web address]    CDC - If You're Sick: cdc.gov/coronavirus/2019-ncov/about/steps-when-sick.html    CDC - Ending Home Isolation: www.cdc.gov/coronavirus/2019-ncov/hcp/disposition-in-home-patients.html    CDC - Caring for Someone: www.cdc.gov/coronavirus/2019-ncov/if-you-are-sick/care-for-someone.html    [facility] - Interim Guidance for Hospital Discharge to Home: www.health.[facility].[place].us/diseases/coronavirus/hcp/hospdischarge.pdf    [facility]  clinical trials (COVID-19 research studies): clinicalaffairs.[email or web address]-clinical-trials    Below are the COVID-19 hotlines at the [place] Department of Health ([facility]). Interpreters are available.  ? For health questions: Call 064-686-8279 or 1-533.329.8090 (7 a.m. to 7 p.m.)  ? For questions about schools and childcare: Call 979-245-8882 or [phone] (7 a.m. to 7 p.m.)    For informational purposes only. Not to replace the advice of your health care provider. Clinically reviewed by Infection Prevention and the [facility] COVID-19 Clinical Team. Copyright   2020 [employer]. All rights reserved. [employer] [id number] - Rev 11/11/20.       Patient Education     Febrile Illness with Uncertain Cause (Adult)   You have a fever, but the cause is unknown. A fever is the body's natural reaction to an illness such as infection due to a virus or bacteria. Sometimes other conditions such as cancer or immune diseases can cause fever. This might be more likely if your fever has lasted for more than a week or 2. In most cases, the higher temperature itself isn't harmful. It actually helps the body fight infections. A fever doesn't need to be treated unless you feel very uncomfortable.   Sometimes a fever can be an early sign of a more serious infection. So follow up if your condition gets worse.   Home care  Unless given other instructions by your healthcare provider, follow these guidelines when caring for yourself at home.   General care    If your symptoms are not severe, rest at home for the first 2 to 3 days. When you are active again, don't let yourself get too tired.    For your overall health, don't smoke. Also stay away from secondhand smoke.    You may not feel like eating too much. So a light diet is fine. Stay hydrated by drinking 6 to 8 glasses of fluids per day. This includes water, soft drinks, sports drinks, juices, tea, or soup. If you have congestion, extra fluids will help loosen  secretions in the nose and lungs.  Medicines    You can take acetaminophen or ibuprofen for pain or to lower your temperature, unless you were given a different medicine to use. If you have chronic liver or kidney disease or have ever had a stomach ulcer or gastrointestinal bleeding, talk with your healthcare provider before using these medicines. Also talk with your provider if you are taking medicine to prevent blood clots. Don't give aspirin to anyone younger than age 19 unless directed by the provider. It may cause a serious illness called Reye syndrome. This most often affects the brain and liver.    If you were given antibiotics for an infection, take them until they are used up, or your healthcare provider tells you to stop. It's important to finish the antibiotics even if you feel better. This is to make sure the infection has cleared. Antibiotics are not often given for a viral infection or a fever with an unknown cause.    Over-the-counter medicines will not shorten the length of the illness. But they may be help with symptoms. These include cough, sore throat, or nasal and sinus congestion. Ask your pharmacist for product suggestions. Don't use decongestants if you have high blood pressure.    Follow-up care  Follow up with your healthcare provider, or as advised.    If a culture or other lab tests were done, you will be told if your treatment needs to be changed. Call your healthcare provider as directed for the results.    If X-rays, a CT scan, MRI, or an ultrasound were done, a specialist will review them. You will be told of any findings that may affect your care. Call your healthcare provider as directed for the results.  Call 911  Call 911if any of these occur:     Trouble breathing or swallowing, or wheezing    Chest pain    Confusion    Extreme drowsiness or trouble waking up    Fainting or loss of consciousness    Fast heart rate    Low blood pressure    Vomiting blood, or large amounts of blood  in stool    Seizure  When to get medical advice  Call your healthcare provider right away if any of these occur:     Cough with lots of colored sputum (mucus) or blood in your sputum    Severe headache    Face, neck, throat, or ear pain    Feeling drowsy    Belly pain    Repeated vomiting or diarrhea; bloody diarrhea    Joint pain or a new rash    Burning when urinating    Fever of 100.4 F (38 C) or higher, or as directed by your provider    Shaking chills    Feeling weak or dizzy  [employer] last reviewed this educational content on 1/1/2020 2000-2021 [employer]. All rights reserved. This information is not intended as a substitute for professional medical care. Always follow your healthcare professional's instructions.           Patient Education     Neutropenia  White blood cells (WBCs) help protect the body from infection. Neutrophils are a type of white blood cell. Their main job is to help the body fight bacterial and fungal infections. Neutropenia occurs when there are fewer neutrophils in the blood than normal. It can range from mild to severe. This depends on the number of neutrophils in the blood. Severe neutropenia puts a person at higher risk for having more infections. Bacterial and fungal infections are most common. Your healthcare provider can tell you more about your condition and whether it needs to be treated.     What causes neutropenia?  There are 2 main types of neutropenia: congenital and acquired. Each type has many causes:     Congenital neutropenia. These are the types that are present at birth. They are caused by certain rare genetic conditions, such as Kostmann syndrome. Most often the neutropenia is mild and normal for certain ethnic groups.    Acquired neutropenia. This type is not present at birth. Causes include:  ? Certain medicines, such as antibiotics and chemotherapy medicines  ? Certain autoimmune conditions  ? Certain viral, bacterial, or parasitic  infections  ? Too little folate or vitamin B-12 in the diet  ? Underlying bone marrow problem, such as leukemia or myelodysplastic syndrome (MDS)  ? Other causes  How is neutropenia diagnosed?  Your healthcare provider may check for neutropenia if you have frequent infections. Your provider may also check for neutropenia if you re having certain treatments, such as chemotherapy, which is known to cause a lower neutrophil count. Tests will be done to confirm the problem. These may include:     A complete blood cell count (CBC). This test measures the amounts of the different types of cells in your blood. This includes the WBCs. The WBC count can be broken down further to find the number of neutrophils and immature neutrophils (bands) in your blood. This is called an absolute neutrophil count (ANC).    A blood smear. This test checks for the different types of blood cells in your blood and how they appear. A sample of your blood is spread on a glass slide and viewed under a microscope. A stain is used so the blood cells can be seen.    A bone marrow aspiration and biopsy. This test checks for problems with how your bone marrow makes blood cells. A needle is used to remove a sample of the bone marrow in your hipbone. The sample is then sent to a lab to be tested for problems.  How is neutropenia treated?    If there is a clear cause of neutropenia, it is addressed. For instance, if a medicine is the cause, it may be stopped or changed.    For mild cases, often no treatment is needed.    For moderate to severe cases, treatment is likely needed. This may include:  ? G-CSF (granulocyte-colony stimulating factor). This is a special type of protein. It helps promote the growth and activity of neutrophils. G-CSF is given by injection.  ? Bone marrow transplant. This treatment replaces diseased bone marrow cells with healthy cells from a matched donor. This treatment is done only in specific severe cases.    What is the  "long-term outcome of neutropenia?  The outcome of neutropenia varies for each person. For some people, neutropenia may resolve after a few weeks or months. For other people, it may be long-lasting. In these cases, ongoing care and treatment is needed. Your healthcare provider will talk to you more about what to expect from your condition.   When to call your healthcare provider  Call your healthcare provider right away if you have any of the following:    Fever of 100.4 F (38 C) or higher. Call 911 or 24-hour urgent care. This is especially important if you have severe neutropenia, which puts you at higher risk for a life-threatening infection.    Cold sweat or chills    Chest pain or trouble breathing    Sore throat    Extreme tiredness or fatigue    Nausea and vomiting    Redness, warmth, or drainage from any open cuts or wounds    Pain or burning with urination; frequent urination    Pain, burning, or bleeding in the rectum    Severe constipation or diarrhea    Bloody stool or urine   How can I prevent infections?  With neutropenia, you need to take extra care to protect yourself from infection:     Wash your hands often, especially before eating and after using the bathroom. Use clean, running water and soap. Scrub for 20 seconds, or for as long as it takes to sing the \"Happy Birthday\" song from beginning to end, twice. Or use a hand gel that contains at least 60% alcohol.    Stay away from crowds and close contact with others who may be ill.    Cook meat and eggs all the way through to kill any germs.    Carefully wash raw fruits and vegetables.    Clean items you use often with disinfectant wipes. This includes phones and computer keyboards.    Don't touch your eyes, nose, and mouth, especially if your hands are not clean.    Practice good oral hygiene. Use a soft toothbrush. Also, brush and floss your teeth gently.    Always wipe from front to back after a bowel movement.    Bathe every day and use an " unscented lotion to prevent cracked skin.    Keep cuts and scrapes clean and covered until they heal.    Don't share items such as drinks, eating utensils, towels, toothbrushes, razors, clothing, and sports equipment.    Store and handle foods safely to prevent food-borne illness.    Protect yourself against pet waste (urine and stool) by using vinyl gloves when cleaning. Always use gloves when gardening.    Ask your healthcare provider if you need to take antibiotics before and after having any dental or medical procedures.    Ask your healthcare provider if you need to wear a special mask near construction sites or farm areas.  [employer] last reviewed this educational content on 4/1/2020 2000-2021 [employer]. All rights reserved. This information is not intended as a substitute for professional medical care. Always follow your healthcare professional's instructions.

## 2022-01-03 ENCOUNTER — OFFICE VISIT (OUTPATIENT)
Dept: FAMILY MEDICINE | Facility: CLINIC | Age: 37
End: 2022-01-03
Payer: COMMERCIAL

## 2022-01-03 VITALS
HEART RATE: 93 BPM | OXYGEN SATURATION: 97 % | RESPIRATION RATE: 16 BRPM | TEMPERATURE: 97.3 F | BODY MASS INDEX: 28.06 KG/M2 | SYSTOLIC BLOOD PRESSURE: 133 MMHG | HEIGHT: 70 IN | WEIGHT: 196 LBS | DIASTOLIC BLOOD PRESSURE: 87 MMHG

## 2022-01-03 DIAGNOSIS — D69.6 THROMBOCYTOPENIA (H): ICD-10-CM

## 2022-01-03 DIAGNOSIS — R50.9 FEVER, UNSPECIFIED: Primary | ICD-10-CM

## 2022-01-03 DIAGNOSIS — D70.3 NEUTROPENIA ASSOCIATED WITH INFECTION (H): ICD-10-CM

## 2022-01-03 LAB
BASOPHILS # BLD AUTO: 0 10E3/UL (ref 0–0.2)
BASOPHILS NFR BLD AUTO: 0 %
EOSINOPHIL # BLD AUTO: 0 10E3/UL (ref 0–0.7)
EOSINOPHIL NFR BLD AUTO: 1 %
ERYTHROCYTE [DISTWIDTH] IN BLOOD BY AUTOMATED COUNT: 12.9 % (ref 10–15)
HCT VFR BLD AUTO: 37.4 % (ref 40–53)
HGB BLD-MCNC: 13.3 G/DL (ref 13.3–17.7)
LYMPHOCYTES # BLD AUTO: 0.9 10E3/UL (ref 0.8–5.3)
LYMPHOCYTES NFR BLD AUTO: 29 %
MCH RBC QN AUTO: 32.4 PG (ref 26.5–33)
MCHC RBC AUTO-ENTMCNC: 35.6 G/DL (ref 31.5–36.5)
MCV RBC AUTO: 91 FL (ref 78–100)
MONOCYTES # BLD AUTO: 0.2 10E3/UL (ref 0–1.3)
MONOCYTES NFR BLD AUTO: 7 %
MONOCYTES NFR BLD AUTO: NEGATIVE %
NEUTROPHILS # BLD AUTO: 1.9 10E3/UL (ref 1.6–8.3)
NEUTROPHILS NFR BLD AUTO: 63 %
PLATELET # BLD AUTO: 126 10E3/UL (ref 150–450)
RBC # BLD AUTO: 4.11 10E6/UL (ref 4.4–5.9)
WBC # BLD AUTO: 3 10E3/UL (ref 4–11)

## 2022-01-03 PROCEDURE — 36415 COLL VENOUS BLD VENIPUNCTURE: CPT | Performed by: FAMILY MEDICINE

## 2022-01-03 PROCEDURE — 86308 HETEROPHILE ANTIBODY SCREEN: CPT | Performed by: FAMILY MEDICINE

## 2022-01-03 PROCEDURE — U0003 INFECTIOUS AGENT DETECTION BY NUCLEIC ACID (DNA OR RNA); SEVERE ACUTE RESPIRATORY SYNDROME CORONAVIRUS 2 (SARS-COV-2) (CORONAVIRUS DISEASE [COVID-19]), AMPLIFIED PROBE TECHNIQUE, MAKING USE OF HIGH THROUGHPUT TECHNOLOGIES AS DESCRIBED BY CMS-2020-01-R: HCPCS | Performed by: FAMILY MEDICINE

## 2022-01-03 PROCEDURE — U0005 INFEC AGEN DETEC AMPLI PROBE: HCPCS | Performed by: FAMILY MEDICINE

## 2022-01-03 PROCEDURE — 99213 OFFICE O/P EST LOW 20 MIN: CPT | Performed by: FAMILY MEDICINE

## 2022-01-03 PROCEDURE — 85025 COMPLETE CBC W/AUTO DIFF WBC: CPT | Performed by: FAMILY MEDICINE

## 2022-01-03 ASSESSMENT — ENCOUNTER SYMPTOMS
VOMITING: 0
NAUSEA: 0
WHEEZING: 0
HEADACHES: 0
ABDOMINAL PAIN: 0
DIARRHEA: 0
DYSURIA: 0
COUGH: 0
SORE THROAT: 0
FEVER: 1

## 2022-01-03 ASSESSMENT — PATIENT HEALTH QUESTIONNAIRE - PHQ9
SUM OF ALL RESPONSES TO PHQ QUESTIONS 1-9: 0
SUM OF ALL RESPONSES TO PHQ QUESTIONS 1-9: 0
10. IF YOU CHECKED OFF ANY PROBLEMS, HOW DIFFICULT HAVE THESE PROBLEMS MADE IT FOR YOU TO DO YOUR WORK, TAKE CARE OF THINGS AT HOME, OR GET ALONG WITH OTHER PEOPLE: NOT DIFFICULT AT ALL

## 2022-01-03 ASSESSMENT — MIFFLIN-ST. JEOR: SCORE: 1830.3

## 2022-01-03 NOTE — PROGRESS NOTES
"  Assessment & Plan     Fever, unspecified  Advised to quarantine at home for at least 5 days till fever resolved  Recheck covid today- if negative- will be informed  Check for mono and repeat blood test for low platelets & white count- most likely secondary to viral upper respiratiry tract infection.  He is concerned if he has lukemia due to night sweats & that remains in DDx but very low in the list.    - Mononucleosis screen  - CBC with platelets and differential  - Symptomatic; Yes; 12/29/2021 COVID-19 Virus (Coronavirus) by PCR Nose             BMI:   Estimated body mass index is 28.12 kg/m  as calculated from the following:    Height as of this encounter: 1.778 m (5' 10\").    Weight as of this encounter: 88.9 kg (196 lb).           Return in about 2 weeks (around 1/17/2022) for concerns,unresolved.    Magaly Alexander MD  [facility]   Ángel is a 36 year old who presents for the following health issues    Fever  This is a new problem. The current episode started in the past 7 days. The problem occurs 2 to 4 times per day. The problem has been unchanged. Associated symptoms include a fever. Pertinent negatives include no abdominal pain, chest pain, congestion, coughing, headaches, nausea, rash, sore throat or vomiting.    known exposure to covid from  Sister in law at [employer] in [place] in 12/25/21  He reports he has been getting daily fever since 12/29/21  His wife is NP- she has been tested negative  He has 6 yo and 3 yo at home- no symptoms at home.    Daily fever with chills since 12/29/21.  Test twice in past week- all negative  Had blood test yesterday.  Takes ibuprofen and tylenol as needed   Took some 1.5 hrsago  Massive night sweats and max fever 103 this am- while under the blanks   Answers for HPI/ROS submitted by the patient on 1/3/2022  If you checked off any problems, how difficult have these problems made it for you to do your work, take care of things " "at home, or get along with other people?: Not difficult at all  PHQ9 TOTAL SCORE: 0  Max temp prior to arrival: 103 to 103.9 F  Temperature source: a tympanic thermometer  muscle aches: No  sleepiness: Yes        ED/UC Followup:    Facility:  [facility]   Date of visit: 01/02/2021  Reason for visit: Fever  Current Status: same           Review of Systems   Constitutional: Positive for fever.   HENT: Negative for congestion, ear pain and sore throat.    Respiratory: Negative for cough and wheezing.    Cardiovascular: Negative for chest pain.   Gastrointestinal: Negative for abdominal pain, diarrhea, nausea and vomiting.   Genitourinary: Negative for dysuria.   Skin: Negative for rash.   Neurological: Negative for headaches.            Objective    /87   Pulse 93   Temp 97.3  F (36.3  C) (Skin)   Resp 16   Ht 1.778 m (5' 10\")   Wt 88.9 kg (196 lb)   SpO2 97%   BMI 28.12 kg/m    Body mass index is 28.12 kg/m .  Physical Exam   GENERAL: healthy, alert and no distress  EYES: Eyes grossly normal to inspection, PERRL and conjunctivae and sclerae normal  HENT: ear canals and TM's normal, nose and mouth without ulcers or lesions  NECK: no adenopathy, no asymmetry, masses, or scars and thyroid normal to palpation  RESP: lungs clear to auscultation - no rales, rhonchi or wheezes  CV: regular rate and rhythm, normal S1 S2, no S3 or S4, no murmur, click or rub, no peripheral edema and peripheral pulses strong  ABDOMEN: soft, nontender, no hepatosplenomegaly, no masses and bowel sounds normal  MS: no gross musculoskeletal defects noted, no edema  SKIN: no suspicious lesions or rashes  NEURO: Normal strength and tone, mentation intact and speech normal  PSYCH: mentation appears normal, affect normal/bright    Results for orders placed or performed in visit on 01/03/22 (from the past 24 hour(s))   Mononucleosis screen   Result Value Ref Range    Mononucleosis Screen Negative Negative   CBC " with platelets and differential    Narrative    The following orders were created for panel order CBC with platelets and differential.  Procedure                               Abnormality         Status                     ---------                               -----------         ------                     CBC with platelets and d...[[id number]]  Abnormal            Final result                 Please view results for these tests on the individual orders.   CBC with platelets and differential   Result Value Ref Range    WBC Count 3.0 (L) 4.0 - 11.0 10e3/uL    RBC Count 4.11 (L) 4.40 - 5.90 10e6/uL    Hemoglobin 13.3 13.3 - 17.7 g/dL    Hematocrit 37.4 (L) 40.0 - 53.0 %    MCV 91 78 - 100 fL    MCH 32.4 26.5 - 33.0 pg    MCHC 35.6 31.5 - 36.5 g/dL    RDW 12.9 10.0 - 15.0 %    Platelet Count 126 (L) 150 - 450 10e3/uL    % Neutrophils 63 %    % Lymphocytes 29 %    % Monocytes 7 %    % Eosinophils 1 %    % Basophils 0 %    Absolute Neutrophils 1.9 1.6 - 8.3 10e3/uL    Absolute Lymphocytes 0.9 0.8 - 5.3 10e3/uL    Absolute Monocytes 0.2 0.0 - 1.3 10e3/uL    Absolute Eosinophils 0.0 0.0 - 0.7 10e3/uL    Absolute Basophils 0.0 0.0 - 0.2 10e3/uL

## 2022-01-04 PROBLEM — R50.9 FEVER, UNSPECIFIED: Status: ACTIVE | Noted: 2022-01-04

## 2022-01-04 PROBLEM — D69.6 THROMBOCYTOPENIA (H): Status: ACTIVE | Noted: 2022-01-04

## 2022-01-04 PROBLEM — D70.3 NEUTROPENIA ASSOCIATED WITH INFECTION (H): Status: ACTIVE | Noted: 2022-01-04

## 2022-01-04 NOTE — RESULT ENCOUNTER NOTE
Hi    Negative test for mono  Rest is consistent with viral illness and can be repeated when fully recovered.  Covid repeat test result is pending  Please keep us posted with questions or concerns .      Best Regards,    Magaly Alexander MD  [facility]  [phone]

## 2022-01-05 LAB — SARS-COV-2 RNA RESP QL NAA+PROBE: NEGATIVE

## 2022-01-05 NOTE — RESULT ENCOUNTER NOTE
Hi    You tested negative for COVID and that's great.  Rest of the labs for white count and platelets can be repeated in follow up and most likely be within normal limits once the viral infection is over.    Please keep us posted with questions or concerns .      Best Regards,    Magaly Alexander MD  [facility]  [phone]

## 2022-05-21 ENCOUNTER — HEALTH MAINTENANCE LETTER (OUTPATIENT)
Age: 37
End: 2022-05-21

## 2022-06-28 NOTE — NURSING NOTE
"Chief Complaint   Patient presents with     Physical       Initial /85  Pulse 89  Temp 98.3  F (36.8  C) (Oral)  Ht 5' 10\" (1.778 m)  Wt 180 lb 8 oz (81.9 kg)  SpO2 97%  BMI 25.9 kg/m2 Estimated body mass index is 25.9 kg/(m^2) as calculated from the following:    Height as of this encounter: 5' 10\" (1.778 m).    Weight as of this encounter: 180 lb 8 oz (81.9 kg).  Medication Reconciliation: complete      Health Maintenance that is potentially due pending provider review:  NONE    n/a    MICHAEL Ridley  " Last office visit:4/27/21  Next scheduled office visit:none scheduled at this time    Medication requested:  norethindrone-ethinyl estradiol-FE (Junel FE 1/20) 1-20 MG-MCG per tablet 28 tablet 0 5/31/2022     Sig - Route: Take 1 tablet by mouth daily      Unable to fill: Patient is past due for physical exam. There have been multiple attempts to contact patient via phone and mail with no response.

## 2022-08-10 ENCOUNTER — VIRTUAL VISIT (OUTPATIENT)
Dept: FAMILY MEDICINE | Facility: CLINIC | Age: 37
End: 2022-08-10
Payer: COMMERCIAL

## 2022-08-10 DIAGNOSIS — D69.6 THROMBOCYTOPENIA (H): ICD-10-CM

## 2022-08-10 DIAGNOSIS — F41.9 ANXIETY: Primary | ICD-10-CM

## 2022-08-10 DIAGNOSIS — Z78.9 ALCOHOL USE: ICD-10-CM

## 2022-08-10 DIAGNOSIS — F33.0 MILD RECURRENT MAJOR DEPRESSION (H): ICD-10-CM

## 2022-08-10 DIAGNOSIS — R03.0 ELEVATED BP WITHOUT DIAGNOSIS OF HYPERTENSION: ICD-10-CM

## 2022-08-10 PROCEDURE — 99214 OFFICE O/P EST MOD 30 MIN: CPT | Mod: GT | Performed by: FAMILY MEDICINE

## 2022-08-10 PROCEDURE — 96127 BRIEF EMOTIONAL/BEHAV ASSMT: CPT | Mod: GT | Performed by: FAMILY MEDICINE

## 2022-08-10 RX ORDER — FLUOXETINE 40 MG/1
40 CAPSULE ORAL DAILY
Qty: 90 CAPSULE | Refills: 3 | Status: SHIPPED | OUTPATIENT
Start: 2022-08-10 | End: 2022-11-03

## 2022-08-10 ASSESSMENT — PATIENT HEALTH QUESTIONNAIRE - PHQ9
10. IF YOU CHECKED OFF ANY PROBLEMS, HOW DIFFICULT HAVE THESE PROBLEMS MADE IT FOR YOU TO DO YOUR WORK, TAKE CARE OF THINGS AT HOME, OR GET ALONG WITH OTHER PEOPLE: NOT DIFFICULT AT ALL
SUM OF ALL RESPONSES TO PHQ QUESTIONS 1-9: 1
SUM OF ALL RESPONSES TO PHQ QUESTIONS 1-9: 1

## 2022-08-10 ASSESSMENT — ANXIETY QUESTIONNAIRES
1. FEELING NERVOUS, ANXIOUS, OR ON EDGE: SEVERAL DAYS
8. IF YOU CHECKED OFF ANY PROBLEMS, HOW DIFFICULT HAVE THESE MADE IT FOR YOU TO DO YOUR WORK, TAKE CARE OF THINGS AT HOME, OR GET ALONG WITH OTHER PEOPLE?: NOT DIFFICULT AT ALL
IF YOU CHECKED OFF ANY PROBLEMS ON THIS QUESTIONNAIRE, HOW DIFFICULT HAVE THESE PROBLEMS MADE IT FOR YOU TO DO YOUR WORK, TAKE CARE OF THINGS AT HOME, OR GET ALONG WITH OTHER PEOPLE: NOT DIFFICULT AT ALL
4. TROUBLE RELAXING: SEVERAL DAYS
GAD7 TOTAL SCORE: 5
2. NOT BEING ABLE TO STOP OR CONTROL WORRYING: SEVERAL DAYS
GAD7 TOTAL SCORE: 5
7. FEELING AFRAID AS IF SOMETHING AWFUL MIGHT HAPPEN: NOT AT ALL
3. WORRYING TOO MUCH ABOUT DIFFERENT THINGS: SEVERAL DAYS
GAD7 TOTAL SCORE: 5
5. BEING SO RESTLESS THAT IT IS HARD TO SIT STILL: NOT AT ALL
6. BECOMING EASILY ANNOYED OR IRRITABLE: SEVERAL DAYS
7. FEELING AFRAID AS IF SOMETHING AWFUL MIGHT HAPPEN: NOT AT ALL

## 2022-08-10 NOTE — PROGRESS NOTES
"Ángel is a 37 year old who is being evaluated via a billable video visit.      How would you like to obtain your AVS? MyChart  If the video visit is dropped, the invitation should be resent by: Text to cell phone: [phone]  Will anyone else be joining your video visit? No          Assessment & Plan     Anxiety  Plan: increase prozac to 40 mg qd   Follow up in 3 weeks for medications and Blood pressure recheck and management.   - FLUoxetine (PROZAC) 40 MG capsule; Take 1 capsule (40 mg) by mouth daily  - EMOTIONAL / BEHAVIORAL ASSESSMENT  Side effect of Prozac discussed in detail.  If mood improves on higher dose yet there are some sexual side effect and we can decrease it to 30 mg though it can be 2 different doses.  Also encouraged him to cut back on alcohol use that alone may decrease anxiety.    Encouraged to continue with exercise, counseling.  Mild recurrent major depression (H)  As above  - FLUoxetine (PROZAC) 40 MG capsule; Take 1 capsule (40 mg) by mouth daily  - EMOTIONAL / BEHAVIORAL ASSESSMENT    Elevated BP without diagnosis of hypertension  Plan: recheck in OV end of the month  Keep alcohol abuse use low, < 5 drinks a week  Low salt.  Continue with excercise   Cut back on alcohol use.  We will check fasting lipid complete metabolic panel.    He has had asymptomatic low white count and platelets..  Will recheck that as well.  Ordering of each unique test  Prescription drug management  33 minutes spent on the date of the encounter doing chart review, patient visit and documentation        BMI:   Estimated body mass index is 28.12 kg/m  as calculated from the following:    Height as of 1/3/22: 1.778 m (5' 10\").    Weight as of 1/3/22: 88.9 kg (196 lb).   Weight management plan: Discussed healthy diet and exercise guidelines        Return in about 3 weeks (around 8/31/2022) for BP Recheck/ HTN, mood.    Magaly Alexander MD  [facility] [place]    Subjective   Ángel is a 37 year old  " presenting for the following health issues:  MH Follow Up, Hypertension, and Weight Problem      History of Present Illness       Mental Health Follow-up:  Patient presents to follow-up on Anxiety.    Patient's anxiety since last visit has been:  Worse  The patient is not having other symptoms associated with anxiety.  Any significant life events: No and health concerns  Patient is feeling anxious or having panic attacks.  Patient has no concerns about alcohol or drug use.    Hypertension: He presents for follow up of hypertension.  He does check blood pressure  regularly outside of the clinic. Outside blood pressures have been over 140/90. He does not follow a low salt diet.      Today's PHQ-9         PHQ-9 Total Score: 1    PHQ-9 Q9 Thoughts of better off dead/self-harm past 2 weeks :   Not at all    How difficult have these problems made it for you to do your work, take care of things at home, or get along with other people: Not difficult at all  Today's VALERY-7 Score: 5     He would like to discuss concerns regarding anxiety medication (Prozac)     Stressed at work, adding to anxiety, trying to get some help.  Prozac always helped and now wondering if he needs a change of medication because Prozac is not as helpful for anxiety as before.  He reports no anxiety attack or panic attack.    Is also concerned about his blood pressure, checks it time to time, noted some high blood pressure.  Talked about diet and exercise.  He tries to exercise daily using his Pel[employer] in the basement.  He is also trying to cut back on caffeine.  He does admit to daily alcohol consumption of about 4 drinks a day without any signs or symptoms of alcohol abuse.  He does endorse that it would be best for him to cut it down, possibly adding to anxiety.    Wants to make office visit appointment for blood pressure recheck medication recheck and also to get fasting blood test completed.       Review of Systems   Constitutional, HEENT,  cardiovascular, pulmonary, GI, , musculoskeletal, neuro, skin, endocrine and psych systems are negative, except as otherwise noted.      Objective    Vitals - Patient Reported  Weight (Patient Reported): 87.5 kg (193 lb)      Vitals:  No vitals were obtained today due to virtual visit.    Physical Exam   GENERAL: Healthy, alert and no distress  EYES: Eyes grossly normal to inspection.  No discharge or erythema, or obvious scleral/conjunctival abnormalities.  RESP: No audible wheeze, cough, or visible cyanosis.  No visible retractions or increased work of breathing.    SKIN: Visible skin clear. No significant rash, abnormal pigmentation or lesions.  NEURO: Cranial nerves grossly intact.  Mentation and speech appropriate for age.  PSYCH: Mentation appears normal, affect normal/bright, judgement and insight intact, normal speech and appearance well-groomed.            Video-Visit Details    Video Start Time: 11:49 AM    Type of service:  Video Visit    Video End Time:12:08 PM    Originating Location (pt. Location): Home    Distant Location (provider location):  [facility]     Platform used for Video Visit: Kyte    Jacinta Workman.

## 2022-08-29 ENCOUNTER — MYC MEDICAL ADVICE (OUTPATIENT)
Dept: FAMILY MEDICINE | Facility: CLINIC | Age: 37
End: 2022-08-29

## 2022-09-17 ENCOUNTER — HEALTH MAINTENANCE LETTER (OUTPATIENT)
Age: 37
End: 2022-09-17

## 2022-11-03 ENCOUNTER — OFFICE VISIT (OUTPATIENT)
Dept: FAMILY MEDICINE | Facility: CLINIC | Age: 37
End: 2022-11-03
Payer: COMMERCIAL

## 2022-11-03 VITALS
HEART RATE: 100 BPM | TEMPERATURE: 97.7 F | OXYGEN SATURATION: 97 % | BODY MASS INDEX: 27.13 KG/M2 | WEIGHT: 189.5 LBS | DIASTOLIC BLOOD PRESSURE: 89 MMHG | RESPIRATION RATE: 16 BRPM | SYSTOLIC BLOOD PRESSURE: 145 MMHG | HEIGHT: 70 IN

## 2022-11-03 DIAGNOSIS — F33.0 MILD RECURRENT MAJOR DEPRESSION (H): ICD-10-CM

## 2022-11-03 DIAGNOSIS — Z13.220 LIPID SCREENING: ICD-10-CM

## 2022-11-03 DIAGNOSIS — Z11.4 SCREENING FOR HIV (HUMAN IMMUNODEFICIENCY VIRUS): ICD-10-CM

## 2022-11-03 DIAGNOSIS — F41.9 ANXIETY: ICD-10-CM

## 2022-11-03 DIAGNOSIS — R03.0 ELEVATED BP WITHOUT DIAGNOSIS OF HYPERTENSION: Primary | ICD-10-CM

## 2022-11-03 DIAGNOSIS — Z11.59 NEED FOR HEPATITIS C SCREENING TEST: ICD-10-CM

## 2022-11-03 LAB
ALBUMIN SERPL-MCNC: 4.3 G/DL (ref 3.4–5)
ALP SERPL-CCNC: 83 U/L (ref 40–150)
ALT SERPL W P-5'-P-CCNC: 46 U/L (ref 0–70)
ANION GAP SERPL CALCULATED.3IONS-SCNC: 6 MMOL/L (ref 3–14)
AST SERPL W P-5'-P-CCNC: 38 U/L (ref 0–45)
BILIRUB SERPL-MCNC: 1 MG/DL (ref 0.2–1.3)
BUN SERPL-MCNC: 16 MG/DL (ref 7–30)
CALCIUM SERPL-MCNC: 9.5 MG/DL (ref 8.5–10.1)
CHLORIDE BLD-SCNC: 106 MMOL/L (ref 94–109)
CHOLEST SERPL-MCNC: 202 MG/DL
CO2 SERPL-SCNC: 25 MMOL/L (ref 20–32)
CREAT SERPL-MCNC: 0.65 MG/DL (ref 0.66–1.25)
FASTING STATUS PATIENT QL REPORTED: ABNORMAL
GFR SERPL CREATININE-BSD FRML MDRD: >90 ML/MIN/1.73M2
GLUCOSE BLD-MCNC: 91 MG/DL (ref 70–99)
HDLC SERPL-MCNC: 91 MG/DL
LDLC SERPL CALC-MCNC: 96 MG/DL
NONHDLC SERPL-MCNC: 111 MG/DL
POTASSIUM BLD-SCNC: 3.9 MMOL/L (ref 3.4–5.3)
PROT SERPL-MCNC: 8 G/DL (ref 6.8–8.8)
SODIUM SERPL-SCNC: 137 MMOL/L (ref 133–144)
TRIGL SERPL-MCNC: 73 MG/DL
TSH SERPL DL<=0.005 MIU/L-ACNC: 0.92 MU/L (ref 0.4–4)

## 2022-11-03 PROCEDURE — 99214 OFFICE O/P EST MOD 30 MIN: CPT | Performed by: FAMILY MEDICINE

## 2022-11-03 PROCEDURE — 80053 COMPREHEN METABOLIC PANEL: CPT | Performed by: FAMILY MEDICINE

## 2022-11-03 PROCEDURE — 84443 ASSAY THYROID STIM HORMONE: CPT | Performed by: FAMILY MEDICINE

## 2022-11-03 PROCEDURE — 87389 HIV-1 AG W/HIV-1&-2 AB AG IA: CPT | Performed by: FAMILY MEDICINE

## 2022-11-03 PROCEDURE — 86803 HEPATITIS C AB TEST: CPT | Performed by: FAMILY MEDICINE

## 2022-11-03 PROCEDURE — 36415 COLL VENOUS BLD VENIPUNCTURE: CPT | Performed by: FAMILY MEDICINE

## 2022-11-03 PROCEDURE — 80061 LIPID PANEL: CPT | Performed by: FAMILY MEDICINE

## 2022-11-03 PROCEDURE — 96127 BRIEF EMOTIONAL/BEHAV ASSMT: CPT | Performed by: FAMILY MEDICINE

## 2022-11-03 RX ORDER — FLUOXETINE 40 MG/1
40 CAPSULE ORAL DAILY
Qty: 90 CAPSULE | Refills: 3 | Status: SHIPPED | OUTPATIENT
Start: 2022-11-03 | End: 2023-07-25

## 2022-11-03 ASSESSMENT — PATIENT HEALTH QUESTIONNAIRE - PHQ9
5. POOR APPETITE OR OVEREATING: NOT AT ALL
SUM OF ALL RESPONSES TO PHQ QUESTIONS 1-9: 0

## 2022-11-03 ASSESSMENT — ANXIETY QUESTIONNAIRES
5. BEING SO RESTLESS THAT IT IS HARD TO SIT STILL: NOT AT ALL
1. FEELING NERVOUS, ANXIOUS, OR ON EDGE: SEVERAL DAYS
7. FEELING AFRAID AS IF SOMETHING AWFUL MIGHT HAPPEN: NOT AT ALL
3. WORRYING TOO MUCH ABOUT DIFFERENT THINGS: NOT AT ALL
2. NOT BEING ABLE TO STOP OR CONTROL WORRYING: NOT AT ALL
IF YOU CHECKED OFF ANY PROBLEMS ON THIS QUESTIONNAIRE, HOW DIFFICULT HAVE THESE PROBLEMS MADE IT FOR YOU TO DO YOUR WORK, TAKE CARE OF THINGS AT HOME, OR GET ALONG WITH OTHER PEOPLE: NOT DIFFICULT AT ALL
GAD7 TOTAL SCORE: 2
GAD7 TOTAL SCORE: 2
6. BECOMING EASILY ANNOYED OR IRRITABLE: SEVERAL DAYS

## 2022-11-03 ASSESSMENT — PAIN SCALES - GENERAL: PAINLEVEL: NO PAIN (0)

## 2022-11-03 ASSESSMENT — ENCOUNTER SYMPTOMS: NERVOUS/ANXIOUS: 1

## 2022-11-03 NOTE — PATIENT INSTRUCTIONS
-Normal  BP is  119/79 or lower. Upper number is systolic Blood pressure (SBP). Lower number is diastolic  Blood pressure (DBP)    -Hypertensive is  BP of 140/90 or above and needs treatment with medication.      You are pre-hypertension   Life style changes help    -Blood pressure between 120-139/80-89 (prehypertensive blood pressure/ class 1 hypertension )     -life style changes that are beneficial to reach goal of normal Blood pressure   1.Weight loss of 1 kg can reduce systolic Blood pressure  (SBP) by 1 mmHg  2.Health healthy diet (eg DASH dietary pattern can reduce SBP by 11 mmHg)  3.Structured excercise program (150 mins aerobic activity/week can reduce SBP by 5 mmHg)  4.Low salt  diet - very important.(eg: cut by 255 or 1000 mg/day to reduce SBP by 5mmHg)  5. Increase 4-5 serving of fresh vegetables & fruits /day- good source of potassium.    Other beneficial tips. Complete smoke cessation- if smoking  Reduction of alcohol     if More than 140/90  after a month of above life style changes  Return visit with MD/provider  for recheck & consideration of medications .

## 2022-11-03 NOTE — PROGRESS NOTES
{PROVIDER CHARTING PREFERENCE:054184}    Subjective   Ángel is a 37 year old{ACCOMPANIED BY STATEMENT (Optional):836082}, presenting for the following health issues:  No chief complaint on file.      History of Present Illness       Reason for visit:  Blood draw/ labs    He eats 2-3 servings of fruits and vegetables daily.He consumes 0 sweetened beverage(s) daily.He exercises with enough effort to increase his heart rate 30 to 60 minutes per day.  He exercises with enough effort to increase his heart rate 6 days per week.   He is taking medications regularly.       {SUPERLIST (Optional):321612}  {additonal problems for provider to add (Optional):180251}    Review of Systems   {ROS COMP (Optional):845782}      Objective    There were no vitals taken for this visit.  There is no height or weight on file to calculate BMI.  Physical Exam   {Exam List (Optional):672739}    {Diagnostic Test Results (Optional):316833}    {AMBULATORY ATTESTATION (Optional):707109}

## 2022-11-04 LAB
HCV AB SERPL QL IA: NONREACTIVE
HIV 1+2 AB+HIV1 P24 AG SERPL QL IA: NONREACTIVE

## 2023-06-04 ENCOUNTER — HEALTH MAINTENANCE LETTER (OUTPATIENT)
Age: 38
End: 2023-06-04

## 2023-07-25 ENCOUNTER — VIRTUAL VISIT (OUTPATIENT)
Dept: FAMILY MEDICINE | Facility: CLINIC | Age: 38
End: 2023-07-25
Payer: COMMERCIAL

## 2023-07-25 DIAGNOSIS — Z13.220 SCREENING FOR LIPID DISORDERS: ICD-10-CM

## 2023-07-25 DIAGNOSIS — R03.0 ELEVATED BP WITHOUT DIAGNOSIS OF HYPERTENSION: ICD-10-CM

## 2023-07-25 DIAGNOSIS — F41.9 ANXIETY: ICD-10-CM

## 2023-07-25 DIAGNOSIS — F32.5 MAJOR DEPRESSION IN REMISSION (H): Primary | ICD-10-CM

## 2023-07-25 DIAGNOSIS — D69.6 THROMBOCYTOPENIA (H): ICD-10-CM

## 2023-07-25 PROBLEM — D70.3 NEUTROPENIA ASSOCIATED WITH INFECTION (H): Status: RESOLVED | Noted: 2022-01-04 | Resolved: 2023-07-25

## 2023-07-25 PROCEDURE — 99213 OFFICE O/P EST LOW 20 MIN: CPT | Mod: VID | Performed by: FAMILY MEDICINE

## 2023-07-25 PROCEDURE — 96127 BRIEF EMOTIONAL/BEHAV ASSMT: CPT | Mod: VID | Performed by: FAMILY MEDICINE

## 2023-07-25 RX ORDER — FLUOXETINE 40 MG/1
40 CAPSULE ORAL DAILY
Qty: 90 CAPSULE | Refills: 3 | Status: SHIPPED | OUTPATIENT
Start: 2023-07-25 | End: 2024-08-20

## 2023-07-25 ASSESSMENT — PATIENT HEALTH QUESTIONNAIRE - PHQ9
SUM OF ALL RESPONSES TO PHQ QUESTIONS 1-9: 0
5. POOR APPETITE OR OVEREATING: NOT AT ALL

## 2023-07-25 ASSESSMENT — ANXIETY QUESTIONNAIRES
1. FEELING NERVOUS, ANXIOUS, OR ON EDGE: SEVERAL DAYS
5. BEING SO RESTLESS THAT IT IS HARD TO SIT STILL: NOT AT ALL
6. BECOMING EASILY ANNOYED OR IRRITABLE: SEVERAL DAYS
IF YOU CHECKED OFF ANY PROBLEMS ON THIS QUESTIONNAIRE, HOW DIFFICULT HAVE THESE PROBLEMS MADE IT FOR YOU TO DO YOUR WORK, TAKE CARE OF THINGS AT HOME, OR GET ALONG WITH OTHER PEOPLE: NOT DIFFICULT AT ALL
7. FEELING AFRAID AS IF SOMETHING AWFUL MIGHT HAPPEN: NOT AT ALL
2. NOT BEING ABLE TO STOP OR CONTROL WORRYING: NOT AT ALL
3. WORRYING TOO MUCH ABOUT DIFFERENT THINGS: SEVERAL DAYS
GAD7 TOTAL SCORE: 3
GAD7 TOTAL SCORE: 3

## 2023-07-25 NOTE — PROGRESS NOTES
"Ángel is a 38 year old who is being evaluated via a billable video visit.      How would you like to obtain your AVS? MyChart  If the video visit is dropped, the invitation should be resent by: Text to cell phone: [phone]  Will anyone else be joining your video visit? No          Assessment & Plan     Major depression in remission (H)  Stable on meds, therapy and excercise   - FLUoxetine (PROZAC) 40 MG capsule  Dispense: 90 capsule; Refill: 3  - EMOTIONAL / BEHAVIORAL ASSESSMENT      8/10/2022     9:50 AM 11/3/2022    11:27 AM 7/25/2023     1:36 PM   PHQ   PHQ-9 Total Score 1 0 0   Q9: Thoughts of better off dead/self-harm past 2 weeks Not at all Not at all Not at all      Anxiety  stable  - FLUoxetine (PROZAC) 40 MG capsule  Dispense: 90 capsule; Refill: 3  - EMOTIONAL / BEHAVIORAL ASSESSMENT      8/10/2022     9:51 AM 11/3/2022    11:27 AM 7/25/2023     1:36 PM   VALERY-7 SCORE   Total Score 5 (mild anxiety)     Total Score 5 2 3        Thrombocytopenia (H)  No symptoms of concern  Recheck as lab only appointment   - CBC with platelets    Elevated BP without diagnosis of hypertension  Plan:commendable successful effort in weight loss, low salt diet, excercise  .  am BP always 120/80.  recheck periodically when well rested in pm- 3/wk or less. Make OV if bp 140/90 or above     - Basic metabolic panel  (Ca, Cl, CO2, Creat, Gluc, K, Na, BUN)    Screening for lipid disorders  Lab only appointment   - Lipid Profile (Chol, Trig, HDL, LDL calc)      Ordering of each unique test  Prescription drug management  I spent a total of 20 minutes on the day of the visit.   Time spent by me doing chart review, history and exam, documentation and further activities per the note       BMI:   Estimated body mass index is 26.98 kg/m  as calculated from the following:    Height as of 11/3/22: 1.785 m (5' 10.28\").    Weight as of 11/3/22: 86 kg (189 lb 8 oz).           Magaly Alexander MD  [facility] " MIKE Neal is a 38 year old, presenting for the following health issues:  Recheck Medication, Hypertension, and Mental Health Problem         No data to display              Mental Health Problem    History of Present Illness       Hypertension: He presents for follow up of hypertension.  He does not check blood pressure  regularly outside of the clinic. Outside blood pressures have been over 140/90. He does not follow a low salt diet.     He eats 2-3 servings of fruits and vegetables daily.He consumes 0 sweetened beverage(s) daily.He exercises with enough effort to increase his heart rate 20 to 29 minutes per day.  He exercises with enough effort to increase his heart rate 6 days per week.   He is taking medications regularly.   Intentional weight loss 14lbs   Alcohol free  for 5 months  Low salt diet  Home Blood pressure machine / check in am -120/80's  Late afternoon, resting still some high Blood pressure  Daily excercise .              Review of Systems   Constitutional, HEENT, cardiovascular, pulmonary, gi and gu systems are negative, except as otherwise noted.      Objective           Vitals:  No vitals were obtained today due to virtual visit.    Physical Exam   GENERAL: Healthy, alert and no distress  EYES: Eyes grossly normal to inspection.  No discharge or erythema, or obvious scleral/conjunctival abnormalities.  RESP: No audible wheeze, cough, or visible cyanosis.  No visible retractions or increased work of breathing.    SKIN: Visible skin clear. No significant rash, abnormal pigmentation or lesions.  NEURO: Cranial nerves grossly intact.  Mentation and speech appropriate for age.  PSYCH: Mentation appears normal, affect normal/bright, judgement and insight intact, normal speech and appearance well-groomed.                Video-Visit Details    Type of service:  Video Visit     Originating Location (pt. Location): Home    Distant Location (provider location):  On-site  Platform used for  Video Visit: Vamsi

## 2023-11-20 ENCOUNTER — VIRTUAL VISIT (OUTPATIENT)
Dept: FAMILY MEDICINE | Facility: CLINIC | Age: 38
End: 2023-11-20
Payer: COMMERCIAL

## 2023-11-20 DIAGNOSIS — R03.0 ELEVATED BP WITHOUT DIAGNOSIS OF HYPERTENSION: Primary | ICD-10-CM

## 2023-11-20 PROCEDURE — 99213 OFFICE O/P EST LOW 20 MIN: CPT | Mod: VID | Performed by: FAMILY MEDICINE

## 2023-11-20 NOTE — PROGRESS NOTES
Ángel is a 38 year old who is being evaluated via a billable video visit.      How would you like to obtain your AVS? MyChart  If the video visit is dropped, the invitation should be resent by: Text to cell phone: [phone]  Will anyone else be joining your video visit? No          Assessment & Plan   38 year old male with strong family history of hypertension- he started checking Blood pressure in am since past 2 weeks and last week- once had Blood pressure of 168/102, and wondering how to reduce his riskfor developing hypertension     1. Elevated BP without diagnosis of hypertension  Plan: spent time counseling patient about Target BP is < 140/90, checking Blood pressure when most relaxed and given following guidelines    Keep up with healthy life style activities  Avoid caffeine and salty diet  Check Blood pressure  once a week or 3 time a month  When most relaxed - does not need to be in am , and not after salty meals snacks, situation and or after caffeine  Make OV appointment if fining that most Blood pressure are > 140/90    Given family history - is is pre-hypertensive and at risk eventually to develop hypertension   Also advised to make office visit for annual or Blood pressure recheck in clinic at least once  Blood tests were futured in July for him to complete for fasting lipid, BMP      I spent a total of 28 minutes on the day of the visit.   Time spent by me doing chart review, history and exam, documentation and further activities per the note           Magaly Alexander MD  [facility]   Ángel is a 38 year old, presenting for the following health issues:  No chief complaint on file.      History of Present Illness       He eats 2-3 servings of fruits and vegetables daily.He consumes 0 sweetened beverage(s) daily.He exercises with enough effort to increase his heart rate 30 to 60 minutes per day.  He exercises with enough effort to increase his heart rate 5 days per  week.   He is taking medications regularly.       Hypertension Follow-up    Do you check your blood pressure regularly outside of the clinic? Yes   Are you following a low salt diet? No  Are your blood pressures ever more than 140 on the top number (systolic) OR more   than 90 on the bottom number (diastolic), for example 140/90? Yes    Dad & younger brother both have hypertension & brother has been on medications as well for sometimes  He started checking Blood pressure at home am for past 2 weeks and   168/102 once last week and next reading 158/100- the same morning  Otherwise all the readings have been 130's/80's    Review of Systems   Constitutional, HEENT, cardiovascular, pulmonary, GI, , musculoskeletal, neuro, skin, endocrine and psych systems are negative, except as otherwise noted.      Objective           Vitals:  No vitals were obtained today due to virtual visit.    Physical Exam   GENERAL: Healthy, alert and no distress  EYES: Eyes grossly normal to inspection.  No discharge or erythema, or obvious scleral/conjunctival abnormalities.  RESP: No audible wheeze, cough, or visible cyanosis.  No visible retractions or increased work of breathing.    SKIN: Visible skin clear. No significant rash, abnormal pigmentation or lesions.  NEURO: Cranial nerves grossly intact.  Mentation and speech appropriate for age.  PSYCH: Mentation appears normal, affect normal/bright, judgement and insight intact, normal speech and appearance well-groomed.            Video-Visit Details    Type of service:  Video Visit     Originating Location (pt. Location): Home    Distant Location (provider location):  On-site  Platform used for Video Visit: FluxDrive

## 2023-11-20 NOTE — PATIENT INSTRUCTIONS
1. Elevated BP without diagnosis of hypertension  Plan: target BP is < 140/90  Keep up with healthy life style activities  Avoid caffeine and salty diet  Check Blood pressure  once a week or 3 time a month  When most relaxed - does not need to be in am , and not after salty meals snacks, situation and or after caffeine  Make OV appointment if fining that most Blood pressure are > 140/90

## 2023-11-20 NOTE — LETTER
My Depression Action Plan  Name: Angel Luis Reynolds   Date of Birth 1985  Date: 11/20/2023    My doctor: Magaly Alexander   My clinic: [facility] [place]  [address], [address]  [facility] [address] [phone]            GREEN    ZONE   Good Control    What it looks like:   Things are going generally well. You have normal ups and downs. You may even feel depressed from time to time, but bad moods usually last less than a day.   What you need to do:  Continue to care for yourself (see self care plan)  Check your depression survival kit and update it as needed  Follow your physician s recommendations including any medication.  Do not stop taking medication unless you consult with your physician first.             YELLOW         ZONE Getting Worse    What it looks like:   Depression is starting to interfere with your life.   It may be hard to get out of bed; you may be starting to isolate yourself from others.  Symptoms of depression are starting to last most all day and this has happened for several days.   You may have suicidal thoughts but they are not constant.   What you need to do:     Call your care team. Your response to treatment will improve if you keep your care team informed of your progress. Yellow periods are signs an adjustment may need to be made.     Continue your self-care.  Just get dressed and ready for the day.  Don't give yourself time to talk yourself out of it.    Talk to someone in your support network.    Open up your Depression Self-Care Plan/Wellness Kit.             RED    ZONE Medical Alert - Get Help    What it looks like:   Depression is seriously interfering with your life.   You may experience these or other symptoms: You can t get out of bed most days, can t work or engage in other necessary activities, you have trouble taking care of basic hygiene, or basic responsibilities, thoughts of suicide or death that will not go away,  self-injurious behavior.     What you need to do:  Call your care team and request a same-day appointment. If they are not available (weekends or after hours) call your local crisis line, emergency room or 911.          Depression Self-Care Plan / Wellness Kit    Many people find that medication and therapy are helpful treatments for managing depression. In addition, making small changes to your everyday life can help to boost your mood and improve your wellbeing. Below are some tips for you to consider. Be sure to talk with your medical provider and/or behavioral health consultant if your symptoms are worsening or not improving.     Sleep   Sleep hygiene  means all of the habits that support good, restful sleep. It includes maintaining a consistent bedtime and wake time, using your bedroom only for sleeping or sex, and keeping the bedroom dark and free of distractions like a computer, smartphone, or television.     Develop a Healthy Routine  Maintain good hygiene. Get out of bed in the morning, make your bed, brush your teeth, take a shower, and get dressed. Don t spend too much time viewing media that makes you feel stressed. Find time to relax each day.    Exercise  Get some form of exercise every day. This will help reduce pain and release endorphins, the  feel good  chemicals in your brain. It can be as simple as just going for a walk or doing some gardening, anything that will get you moving.      Diet  Strive to eat healthy foods, including fruits and vegetables. Drink plenty of water. Avoid excessive sugar, caffeine, alcohol, and other mood-altering substances.     Stay Connected with Others  Stay in touch with friends and family members.    Manage Your Mood  Try deep breathing, massage therapy, biofeedback, or meditation. Take part in fun activities when you can. Try to find something to smile about each day.     Psychotherapy  Be open to working with a therapist if your provider recommends it.      Medication  Be sure to take your medication as prescribed. Most anti-depressants need to be taken every day. It usually takes several weeks for medications to work. Not all medicines work for all people. It is important to follow-up with your provider to make sure you have a treatment plan that is working for you. Do not stop your medication abruptly without first discussing it with your provider.    Crisis Resources   These hotlines are for both adults and children. They and are open 24 hours a day, 7 days a week unless noted otherwise.    National Suicide Prevention Lifeline   988 or 8-042-400-JMSQ (6665)    Crisis Text Line    www.crisistextline.org  Text HOME to 634594 from anywhere in the [place], anytime, about any type of crisis. A live, trained crisis counselor will receive the text and respond quickly.    [place] Lifeline for LGBTQ Youth  A national crisis intervention and suicide lifeline for LGBTQ youth under 25. Provides a safe place to talk without judgement. Call 1-598.868.6759; text START to 727547 or visit www.thetrevorproject.org to talk to a trained counselor.    For [facility] crisis numbers, visit the [facility] website at:  https://[place].gov/dhs/people-we-serve/adults/health-care/mental-health/resources/crisis-contacts.jsp

## 2024-07-13 ENCOUNTER — HEALTH MAINTENANCE LETTER (OUTPATIENT)
Age: 39
End: 2024-07-13

## 2024-10-28 ASSESSMENT — PATIENT HEALTH QUESTIONNAIRE - PHQ9: SUM OF ALL RESPONSES TO PHQ QUESTIONS 1-9: 10

## 2024-12-14 SDOH — HEALTH STABILITY: PHYSICAL HEALTH: ON AVERAGE, HOW MANY DAYS PER WEEK DO YOU ENGAGE IN MODERATE TO STRENUOUS EXERCISE (LIKE A BRISK WALK)?: 4 DAYS

## 2024-12-14 SDOH — HEALTH STABILITY: PHYSICAL HEALTH: ON AVERAGE, HOW MANY MINUTES DO YOU ENGAGE IN EXERCISE AT THIS LEVEL?: 50 MIN

## 2024-12-14 ASSESSMENT — SOCIAL DETERMINANTS OF HEALTH (SDOH): HOW OFTEN DO YOU GET TOGETHER WITH FRIENDS OR RELATIVES?: NEVER

## 2025-01-25 SDOH — HEALTH STABILITY: PHYSICAL HEALTH: ON AVERAGE, HOW MANY DAYS PER WEEK DO YOU ENGAGE IN MODERATE TO STRENUOUS EXERCISE (LIKE A BRISK WALK)?: 4 DAYS

## 2025-01-25 SDOH — HEALTH STABILITY: PHYSICAL HEALTH: ON AVERAGE, HOW MANY MINUTES DO YOU ENGAGE IN EXERCISE AT THIS LEVEL?: 50 MIN

## 2025-01-25 ASSESSMENT — ANXIETY QUESTIONNAIRES
3. WORRYING TOO MUCH ABOUT DIFFERENT THINGS: SEVERAL DAYS
7. FEELING AFRAID AS IF SOMETHING AWFUL MIGHT HAPPEN: NOT AT ALL
1. FEELING NERVOUS, ANXIOUS, OR ON EDGE: SEVERAL DAYS
6. BECOMING EASILY ANNOYED OR IRRITABLE: SEVERAL DAYS
2. NOT BEING ABLE TO STOP OR CONTROL WORRYING: SEVERAL DAYS
5. BEING SO RESTLESS THAT IT IS HARD TO SIT STILL: SEVERAL DAYS
GAD7 TOTAL SCORE: 6
7. FEELING AFRAID AS IF SOMETHING AWFUL MIGHT HAPPEN: NOT AT ALL
4. TROUBLE RELAXING: SEVERAL DAYS
8. IF YOU CHECKED OFF ANY PROBLEMS, HOW DIFFICULT HAVE THESE MADE IT FOR YOU TO DO YOUR WORK, TAKE CARE OF THINGS AT HOME, OR GET ALONG WITH OTHER PEOPLE?: NOT DIFFICULT AT ALL
IF YOU CHECKED OFF ANY PROBLEMS ON THIS QUESTIONNAIRE, HOW DIFFICULT HAVE THESE PROBLEMS MADE IT FOR YOU TO DO YOUR WORK, TAKE CARE OF THINGS AT HOME, OR GET ALONG WITH OTHER PEOPLE: NOT DIFFICULT AT ALL

## 2025-01-25 ASSESSMENT — SOCIAL DETERMINANTS OF HEALTH (SDOH): HOW OFTEN DO YOU GET TOGETHER WITH FRIENDS OR RELATIVES?: NEVER

## 2025-01-30 ENCOUNTER — OFFICE VISIT (OUTPATIENT)
Dept: FAMILY MEDICINE | Facility: CLINIC | Age: 40
End: 2025-01-30
Payer: COMMERCIAL

## 2025-01-30 VITALS
TEMPERATURE: 97.5 F | SYSTOLIC BLOOD PRESSURE: 150 MMHG | HEIGHT: 70 IN | DIASTOLIC BLOOD PRESSURE: 110 MMHG | WEIGHT: 183.9 LBS | BODY MASS INDEX: 26.33 KG/M2 | HEART RATE: 85 BPM | RESPIRATION RATE: 16 BRPM | OXYGEN SATURATION: 99 %

## 2025-01-30 DIAGNOSIS — I10 HYPERTENSION GOAL BP (BLOOD PRESSURE) < 140/90: ICD-10-CM

## 2025-01-30 DIAGNOSIS — Z13.220 SCREENING FOR LIPID DISORDERS: ICD-10-CM

## 2025-01-30 DIAGNOSIS — F41.9 ANXIETY: ICD-10-CM

## 2025-01-30 DIAGNOSIS — F32.5 MAJOR DEPRESSION IN REMISSION: ICD-10-CM

## 2025-01-30 DIAGNOSIS — Z00.00 ROUTINE GENERAL MEDICAL EXAMINATION AT A HEALTH CARE FACILITY: Primary | ICD-10-CM

## 2025-01-30 LAB
ANION GAP SERPL CALCULATED.3IONS-SCNC: 11 MMOL/L (ref 7–15)
BUN SERPL-MCNC: 12.1 MG/DL (ref 6–20)
CALCIUM SERPL-MCNC: 10 MG/DL (ref 8.8–10.4)
CHLORIDE SERPL-SCNC: 99 MMOL/L (ref 98–107)
CHOLEST SERPL-MCNC: 158 MG/DL
CREAT SERPL-MCNC: 0.78 MG/DL (ref 0.67–1.17)
CREAT UR-MCNC: 21.2 MG/DL
EGFRCR SERPLBLD CKD-EPI 2021: >90 ML/MIN/1.73M2
FASTING STATUS PATIENT QL REPORTED: NO
FASTING STATUS PATIENT QL REPORTED: NO
GLUCOSE SERPL-MCNC: 83 MG/DL (ref 70–99)
HCO3 SERPL-SCNC: 27 MMOL/L (ref 22–29)
HDLC SERPL-MCNC: 63 MG/DL
LDLC SERPL CALC-MCNC: 81 MG/DL
MICROALBUMIN UR-MCNC: <12 MG/L
MICROALBUMIN/CREAT UR: NORMAL MG/G{CREAT}
NONHDLC SERPL-MCNC: 95 MG/DL
POTASSIUM SERPL-SCNC: 3.9 MMOL/L (ref 3.4–5.3)
SODIUM SERPL-SCNC: 137 MMOL/L (ref 135–145)
TRIGL SERPL-MCNC: 69 MG/DL

## 2025-01-30 RX ORDER — FLUOXETINE 40 MG/1
40 CAPSULE ORAL DAILY
Qty: 30 CAPSULE | Refills: 3 | Status: SHIPPED | OUTPATIENT
Start: 2025-01-30

## 2025-01-30 RX ORDER — LISINOPRIL 10 MG/1
10 TABLET ORAL DAILY
Qty: 30 TABLET | Refills: 3 | Status: SHIPPED | OUTPATIENT
Start: 2025-01-30

## 2025-01-30 ASSESSMENT — PAIN SCALES - GENERAL: PAINLEVEL_OUTOF10: NO PAIN (0)

## 2025-01-30 NOTE — PATIENT INSTRUCTIONS
Patient Education     BP Readings from Last 6 Encounters:   01/30/25 (!) 154/90   11/03/22 (!) 145/89   01/03/22 133/87   01/02/22 132/87   03/24/21 138/89   10/30/19 126/87      Preventive Care Advice   This is general advice given by our system to help you stay healthy. However, your care team may have specific advice just for you. Please talk to your care team about your preventive care needs.  Nutrition  Eat 5 or more servings of fruits and vegetables each day.  Try wheat bread, brown rice and whole grain pasta (instead of white bread, rice, and pasta).  Get enough calcium and vitamin D. Check the label on foods and aim for 100% of the RDA (recommended daily allowance).  Lifestyle  Exercise at least 150 minutes each week  (30 minutes a day, 5 days a week).  Do muscle strengthening activities 2 days a week. These help control your weight and prevent disease.  No smoking.  Wear sunscreen to prevent skin cancer.  Have a dental exam and cleaning every 6 months.  Yearly exams  See your health care team every year to talk about:  Any changes in your health.  Any medicines your care team has prescribed.  Preventive care, family planning, and ways to prevent chronic diseases.  Shots (vaccines)   HPV shots (up to age 26), if you've never had them before.  Hepatitis B shots (up to age 59), if you've never had them before.  COVID-19 shot: Get this shot when it's due.  Flu shot: Get a flu shot every year.  Tetanus shot: Get a tetanus shot every 10 years.  Pneumococcal, hepatitis A, and RSV shots: Ask your care team if you need these based on your risk.  Shingles shot (for age 50 and up)  General health tests  Diabetes screening:  Starting at age 35, Get screened for diabetes at least every 3 years.  If you are younger than age 35, ask your care team if you should be screened for diabetes.  Cholesterol test: At age 39, start having a cholesterol test every 5 years, or more often if advised.  Bone density scan (DEXA): At age  50, ask your care team if you should have this scan for osteoporosis (brittle bones).  Hepatitis C: Get tested at least once in your life.  STIs (sexually transmitted infections)  Before age 24: Ask your care team if you should be screened for STIs.  After age 24: Get screened for STIs if you're at risk. You are at risk for STIs (including HIV) if:  You are sexually active with more than one person.  You don't use condoms every time.  You or a partner was diagnosed with a sexually transmitted infection.  If you are at risk for HIV, ask about PrEP medicine to prevent HIV.  Get tested for HIV at least once in your life, whether you are at risk for HIV or not.  Cancer screening tests  Cervical cancer screening: If you have a cervix, begin getting regular cervical cancer screening tests starting at age 21.  Breast cancer scan (mammogram): If you've ever had breasts, begin having regular mammograms starting at age 40. This is a scan to check for breast cancer.  Colon cancer screening: It is important to start screening for colon cancer at age 45.  Have a colonoscopy test every 10 years (or more often if you're at risk) Or, ask your provider about stool tests like a FIT test every year or Cologuard test every 3 years.  To learn more about your testing options, visit:   .  For help making a decision, visit:   https://bit.ly/cp03483.  Prostate cancer screening test: If you have a prostate, ask your care team if a prostate cancer screening test (PSA) at age 55 is right for you.  Lung cancer screening: If you are a current or former smoker ages 50 to 80, ask your care team if ongoing lung cancer screenings are right for you.  For informational purposes only. Not to replace the advice of your health care provider. Copyright   2023 [employer]. All rights reserved. Clinically reviewed by the [facility] Transitions Program. [employer] [id number] - REV 01/24.

## 2025-01-30 NOTE — PROGRESS NOTES
"Preventive Care Visit  [facility]  Magaly Alexander MD, Family Medicine  Jan 30, 2025      Assessment & Plan     1. Routine general medical examination at a health care facility (Primary)  Plan:    2. Major depression in remission- refilled meds for 1 yr   Plan:- FLUoxetine (PROZAC) 40 MG capsule; Take 1 capsule (40 mg) by mouth daily.  Dispense: 30 capsule; Refill: 3    3. Anxiety  Plan:  - FLUoxetine (PROZAC) 40 MG capsule; Take 1 capsule (40 mg) by mouth daily.  Dispense: 30 capsule; Refill: 3    4. Hypertension goal BP (blood pressure) < 140/90  Plan: agreeable to start medications for hypertension  Has active life style/ runner/ intentional weight loss  - Basic metabolic panel  (Ca, Cl, CO2, Creat, Gluc, K, Na, BUN); Future  - Albumin Random Urine Quantitative with Creat Ratio; Future  - lisinopril (ZESTRIL) 10 MG tablet; Take 1 tablet (10 mg) by mouth daily.  Dispense: 30 tablet; Refill: 3  Potential medication side effects were discussed with the patient; let me know if any occur.        5. Screening for lipid disorders  Fasting fir labs   - Lipid Profile (Chol, Trig, HDL, LDL calc); Future      Patient has been advised of split billing requirements and indicates understanding: Yes        BMI  Estimated body mass index is 26.09 kg/m  as calculated from the following:    Height as of this encounter: 1.788 m (5' 10.39\").    Weight as of this encounter: 83.4 kg (183 lb 14.4 oz).       Counseling  Appropriate preventive services were addressed with this patient via screening, questionnaire, or discussion as appropriate for fall prevention, nutrition, physical activity, Tobacco-use cessation, social engagement, weight loss and cognition.  Checklist reviewing preventive services available has been given to the patient.  Reviewed patient's diet, addressing concerns and/or questions.   Patient is at risk for social isolation and has been provided with information about the benefit of social " connection.       Regular exercise    Subjective   Ángel is a 39 year old, presenting for the following:  Physical        1/30/2025    11:00 AM   Additional Questions   Roomed by Kay          HPI  Answers submitted by the patient for this visit:  Patient Health Questionnaire (Submitted on 1/29/2025)  If you checked off any problems, how difficult have these problems made it for you to do your work, take care of things at home, or get along with other people?: Not difficult at all  PHQ9 TOTAL SCORE: 1  Patient Health Questionnaire (G7) (Submitted on 1/25/2025)  VALERY 7 TOTAL SCORE: 6    MH stable on prozac   he denies suicidal thoughts or ideation.reports no side effects from medications. Would like to continue.    Brother /dad of hypertension medications  Willing to start medications for his his Blood pressure  Has Blood pressure apparatus at home..Runner.Intentional weight loss  Intermittent fasting by habit       Health Care Directive  Patient does not have a Health Care Directive: Discussed advance care planning with patient; however, patient declined at this time.      1/25/2025   General Health   How would you rate your overall physical health? Excellent   Feel stress (tense, anxious, or unable to sleep) Only a little   (!) STRESS CONCERN      1/25/2025   Nutrition   Three or more servings of calcium each day? Yes   Diet: Regular (no restrictions)   How many servings of fruit and vegetables per day? (!) 2-3   How many sweetened beverages each day? 0-1         1/25/2025   Exercise   Days per week of moderate/strenous exercise 4 days   Average minutes spent exercising at this level 50 min         1/25/2025   Social Factors   Frequency of gathering with friends or relatives Never   Worry food won't last until get money to buy more No   Food not last or not have enough money for food? No   Do you have housing? (Housing is defined as stable permanent housing and does not include staying ouside in a car, in a tent,  "in an abandoned building, in an overnight shelter, or couch-surfing.) Yes   Are you worried about losing your housing? No   Lack of transportation? No   Unable to get utilities (heat,electricity)? No   (!) SOCIAL CONNECTIONS CONCERN      1/25/2025   Dental   Dentist two times every year? Yes         11/9/2024   TB Screening   Were you born outside of the [place]? No       Today's PHQ-9 Score:       1/29/2025    12:51 PM   PHQ-9 SCORE   PHQ-9 Total Score MyChart 1 (Minimal depression)   PHQ-9 Total Score 1        Patient-reported         1/25/2025   Substance Use   Alcohol more than 3/day or more than 7/wk No   Do you use any other substances recreationally? No     Social History     Tobacco Use    Smoking status: Never    Smokeless tobacco: Never   Vaping Use    Vaping status: Never Used   Substance Use Topics    Alcohol use: Yes     Comment: Socially     Drug use: No           1/25/2025   STI Screening   New sexual partner(s) since last STI/HIV test? No         1/25/2025   Contraception/Family Planning   Questions about contraception or family planning No        Reviewed and updated as needed this visit by Provider                    BP Readings from Last 3 Encounters:   01/30/25 (!) 154/90   11/03/22 (!) 145/89   01/03/22 133/87    Wt Readings from Last 3 Encounters:   01/30/25 83.4 kg (183 lb 14.4 oz)   11/03/22 86 kg (189 lb 8 oz)   01/03/22 88.9 kg (196 lb)                      Review of Systems  Constitutional, HEENT, cardiovascular, pulmonary, GI, , musculoskeletal, neuro, skin, endocrine and psych systems are negative, except as otherwise noted.     Objective    Exam  BP (!) 154/90 (BP Location: Left arm, Patient Position: Sitting, Cuff Size: Adult Regular)   Pulse 85   Temp 97.5  F (36.4  C) (Skin)   Resp 16   Ht 1.788 m (5' 10.39\")   Wt 83.4 kg (183 lb 14.4 oz)   SpO2 99%   BMI 26.09 kg/m     Estimated body mass index is 26.09 kg/m  as calculated from the following:    Height as of this encounter: " "1.788 m (5' 10.39\").    Weight as of this encounter: 83.4 kg (183 lb 14.4 oz).    Physical Exam  GENERAL: alert and no distress  EYES: Eyes grossly normal to inspection, PERRL and conjunctivae and sclerae normal  HENT: ear canals and TM's normal, nose and mouth without ulcers or lesions  NECK: no adenopathy, no asymmetry, masses, or scars  RESP: lungs clear to auscultation - no rales, rhonchi or wheezes  CV: regular rate and rhythm, normal S1 S2, no S3 or S4, no murmur, click or rub, no peripheral edema  ABDOMEN: soft, nontender, no hepatosplenomegaly, no masses and bowel sounds normal  MS: no gross musculoskeletal defects noted, no edema  SKIN: no suspicious lesions or rashes  NEURO: Normal strength and tone, mentation intact and speech normal  PSYCH: mentation appears normal, affect normal/bright      11/3/2022    11:27 AM 7/25/2023     1:36 PM 1/29/2025    12:51 PM   PHQ   PHQ-9 Total Score 0 0 1    Q9: Thoughts of better off dead/self-harm past 2 weeks Not at all Not at all Not at all       Patient-reported          11/3/2022    11:27 AM 7/25/2023     1:36 PM 1/25/2025     9:26 AM   VALERY-7 SCORE   Total Score   6 (mild anxiety)   Total Score 2 3 6        Patient-reported            Signed Electronically by: Magaly Alexander MD    "